# Patient Record
Sex: FEMALE | Race: BLACK OR AFRICAN AMERICAN | NOT HISPANIC OR LATINO | URBAN - METROPOLITAN AREA
[De-identification: names, ages, dates, MRNs, and addresses within clinical notes are randomized per-mention and may not be internally consistent; named-entity substitution may affect disease eponyms.]

---

## 2021-07-02 ENCOUNTER — INPATIENT (INPATIENT)
Facility: HOSPITAL | Age: 66
LOS: 6 days | Discharge: HOME | DRG: 871 | End: 2021-07-09
Attending: INTERNAL MEDICINE | Admitting: INTERNAL MEDICINE
Payer: COMMERCIAL

## 2021-07-02 VITALS
TEMPERATURE: 102 F | HEIGHT: 64 IN | RESPIRATION RATE: 28 BRPM | DIASTOLIC BLOOD PRESSURE: 86 MMHG | HEART RATE: 121 BPM | WEIGHT: 67.68 LBS | OXYGEN SATURATION: 90 % | SYSTOLIC BLOOD PRESSURE: 180 MMHG

## 2021-07-02 DIAGNOSIS — J18.9 PNEUMONIA, UNSPECIFIED ORGANISM: ICD-10-CM

## 2021-07-02 LAB
ALBUMIN SERPL ELPH-MCNC: 3.5 G/DL — SIGNIFICANT CHANGE UP (ref 3.3–5.2)
ALP SERPL-CCNC: 236 U/L — HIGH (ref 40–120)
ALT FLD-CCNC: 76 U/L — HIGH
ANION GAP SERPL CALC-SCNC: 14 MMOL/L — SIGNIFICANT CHANGE UP (ref 5–17)
APPEARANCE UR: CLEAR — SIGNIFICANT CHANGE UP
APTT BLD: 29.3 SEC — SIGNIFICANT CHANGE UP (ref 27.5–35.5)
AST SERPL-CCNC: 75 U/L — HIGH
BACTERIA # UR AUTO: ABNORMAL
BASOPHILS # BLD AUTO: 0.04 K/UL — SIGNIFICANT CHANGE UP (ref 0–0.2)
BASOPHILS NFR BLD AUTO: 0.3 % — SIGNIFICANT CHANGE UP (ref 0–2)
BILIRUB SERPL-MCNC: 1.2 MG/DL — SIGNIFICANT CHANGE UP (ref 0.4–2)
BILIRUB UR-MCNC: NEGATIVE — SIGNIFICANT CHANGE UP
BUN SERPL-MCNC: 6.9 MG/DL — LOW (ref 8–20)
CALCIUM SERPL-MCNC: 9 MG/DL — SIGNIFICANT CHANGE UP (ref 8.6–10.2)
CHLORIDE SERPL-SCNC: 97 MMOL/L — LOW (ref 98–107)
CO2 SERPL-SCNC: 23 MMOL/L — SIGNIFICANT CHANGE UP (ref 22–29)
COLOR SPEC: YELLOW — SIGNIFICANT CHANGE UP
CREAT SERPL-MCNC: 0.75 MG/DL — SIGNIFICANT CHANGE UP (ref 0.5–1.3)
DIFF PNL FLD: NEGATIVE — SIGNIFICANT CHANGE UP
EOSINOPHIL # BLD AUTO: 0.01 K/UL — SIGNIFICANT CHANGE UP (ref 0–0.5)
EOSINOPHIL NFR BLD AUTO: 0.1 % — SIGNIFICANT CHANGE UP (ref 0–6)
EPI CELLS # UR: SIGNIFICANT CHANGE UP
GLUCOSE SERPL-MCNC: 167 MG/DL — HIGH (ref 70–99)
GLUCOSE UR QL: NEGATIVE MG/DL — SIGNIFICANT CHANGE UP
HCT VFR BLD CALC: 39.1 % — SIGNIFICANT CHANGE UP (ref 34.5–45)
HGB BLD-MCNC: 13.4 G/DL — SIGNIFICANT CHANGE UP (ref 11.5–15.5)
IMM GRANULOCYTES NFR BLD AUTO: 0.6 % — SIGNIFICANT CHANGE UP (ref 0–1.5)
INR BLD: 1.34 RATIO — HIGH (ref 0.88–1.16)
KETONES UR-MCNC: NEGATIVE — SIGNIFICANT CHANGE UP
LACTATE BLDV-MCNC: 1.3 MMOL/L — SIGNIFICANT CHANGE UP (ref 0.5–2)
LEUKOCYTE ESTERASE UR-ACNC: NEGATIVE — SIGNIFICANT CHANGE UP
LYMPHOCYTES # BLD AUTO: 1.75 K/UL — SIGNIFICANT CHANGE UP (ref 1–3.3)
LYMPHOCYTES # BLD AUTO: 11.2 % — LOW (ref 13–44)
MCHC RBC-ENTMCNC: 31.6 PG — SIGNIFICANT CHANGE UP (ref 27–34)
MCHC RBC-ENTMCNC: 34.3 GM/DL — SIGNIFICANT CHANGE UP (ref 32–36)
MCV RBC AUTO: 92.2 FL — SIGNIFICANT CHANGE UP (ref 80–100)
MONOCYTES # BLD AUTO: 1.23 K/UL — HIGH (ref 0–0.9)
MONOCYTES NFR BLD AUTO: 7.9 % — SIGNIFICANT CHANGE UP (ref 2–14)
NEUTROPHILS # BLD AUTO: 12.43 K/UL — HIGH (ref 1.8–7.4)
NEUTROPHILS NFR BLD AUTO: 79.9 % — HIGH (ref 43–77)
NITRITE UR-MCNC: NEGATIVE — SIGNIFICANT CHANGE UP
PH UR: 7 — SIGNIFICANT CHANGE UP (ref 5–8)
PLATELET # BLD AUTO: 451 K/UL — HIGH (ref 150–400)
POTASSIUM SERPL-MCNC: 3.1 MMOL/L — LOW (ref 3.5–5.3)
POTASSIUM SERPL-SCNC: 3.1 MMOL/L — LOW (ref 3.5–5.3)
PROT SERPL-MCNC: 7.5 G/DL — SIGNIFICANT CHANGE UP (ref 6.6–8.7)
PROT UR-MCNC: 15 MG/DL
PROTHROM AB SERPL-ACNC: 15.3 SEC — HIGH (ref 10.6–13.6)
RAPID RVP RESULT: SIGNIFICANT CHANGE UP
RBC # BLD: 4.24 M/UL — SIGNIFICANT CHANGE UP (ref 3.8–5.2)
RBC # FLD: 12.7 % — SIGNIFICANT CHANGE UP (ref 10.3–14.5)
RBC CASTS # UR COMP ASSIST: SIGNIFICANT CHANGE UP /HPF (ref 0–4)
SARS-COV-2 RNA SPEC QL NAA+PROBE: SIGNIFICANT CHANGE UP
SODIUM SERPL-SCNC: 134 MMOL/L — LOW (ref 135–145)
SP GR SPEC: 1 — LOW (ref 1.01–1.02)
UROBILINOGEN FLD QL: 1 MG/DL
WBC # BLD: 15.56 K/UL — HIGH (ref 3.8–10.5)
WBC # FLD AUTO: 15.56 K/UL — HIGH (ref 3.8–10.5)
WBC UR QL: SIGNIFICANT CHANGE UP

## 2021-07-02 PROCEDURE — 71045 X-RAY EXAM CHEST 1 VIEW: CPT | Mod: 26

## 2021-07-02 PROCEDURE — 99291 CRITICAL CARE FIRST HOUR: CPT | Mod: 25

## 2021-07-02 PROCEDURE — 93010 ELECTROCARDIOGRAM REPORT: CPT

## 2021-07-02 PROCEDURE — 99223 1ST HOSP IP/OBS HIGH 75: CPT

## 2021-07-02 PROCEDURE — 32551 INSERTION OF CHEST TUBE: CPT

## 2021-07-02 RX ORDER — SODIUM CHLORIDE 9 MG/ML
950 INJECTION INTRAMUSCULAR; INTRAVENOUS; SUBCUTANEOUS ONCE
Refills: 0 | Status: DISCONTINUED | OUTPATIENT
Start: 2021-07-02 | End: 2021-07-02

## 2021-07-02 RX ORDER — ACETAMINOPHEN 500 MG
650 TABLET ORAL EVERY 6 HOURS
Refills: 0 | Status: DISCONTINUED | OUTPATIENT
Start: 2021-07-02 | End: 2021-07-09

## 2021-07-02 RX ORDER — CEFTRIAXONE 500 MG/1
1000 INJECTION, POWDER, FOR SOLUTION INTRAMUSCULAR; INTRAVENOUS EVERY 24 HOURS
Refills: 0 | Status: COMPLETED | OUTPATIENT
Start: 2021-07-02 | End: 2021-07-06

## 2021-07-02 RX ORDER — DEXTROSE MONOHYDRATE, SODIUM CHLORIDE, AND POTASSIUM CHLORIDE 50; .745; 4.5 G/1000ML; G/1000ML; G/1000ML
1000 INJECTION, SOLUTION INTRAVENOUS
Refills: 0 | Status: COMPLETED | OUTPATIENT
Start: 2021-07-02 | End: 2021-07-02

## 2021-07-02 RX ORDER — AZITHROMYCIN 500 MG/1
500 TABLET, FILM COATED ORAL ONCE
Refills: 0 | Status: COMPLETED | OUTPATIENT
Start: 2021-07-02 | End: 2021-07-02

## 2021-07-02 RX ORDER — POTASSIUM CHLORIDE 20 MEQ
40 PACKET (EA) ORAL ONCE
Refills: 0 | Status: COMPLETED | OUTPATIENT
Start: 2021-07-02 | End: 2021-07-02

## 2021-07-02 RX ORDER — CEFTRIAXONE 500 MG/1
1000 INJECTION, POWDER, FOR SOLUTION INTRAMUSCULAR; INTRAVENOUS ONCE
Refills: 0 | Status: COMPLETED | OUTPATIENT
Start: 2021-07-02 | End: 2021-07-02

## 2021-07-02 RX ORDER — ENOXAPARIN SODIUM 100 MG/ML
40 INJECTION SUBCUTANEOUS DAILY
Refills: 0 | Status: DISCONTINUED | OUTPATIENT
Start: 2021-07-02 | End: 2021-07-09

## 2021-07-02 RX ORDER — ACETAMINOPHEN 500 MG
650 TABLET ORAL ONCE
Refills: 0 | Status: COMPLETED | OUTPATIENT
Start: 2021-07-02 | End: 2021-07-02

## 2021-07-02 RX ORDER — POTASSIUM CHLORIDE 20 MEQ
10 PACKET (EA) ORAL ONCE
Refills: 0 | Status: COMPLETED | OUTPATIENT
Start: 2021-07-02 | End: 2021-07-02

## 2021-07-02 RX ORDER — TRAMADOL HYDROCHLORIDE 50 MG/1
50 TABLET ORAL THREE TIMES A DAY
Refills: 0 | Status: DISCONTINUED | OUTPATIENT
Start: 2021-07-02 | End: 2021-07-02

## 2021-07-02 RX ORDER — ONDANSETRON 8 MG/1
4 TABLET, FILM COATED ORAL
Refills: 0 | Status: DISCONTINUED | OUTPATIENT
Start: 2021-07-02 | End: 2021-07-09

## 2021-07-02 RX ORDER — AZITHROMYCIN 500 MG/1
500 TABLET, FILM COATED ORAL EVERY 24 HOURS
Refills: 0 | Status: COMPLETED | OUTPATIENT
Start: 2021-07-02 | End: 2021-07-06

## 2021-07-02 RX ORDER — SODIUM CHLORIDE 9 MG/ML
2000 INJECTION INTRAMUSCULAR; INTRAVENOUS; SUBCUTANEOUS ONCE
Refills: 0 | Status: COMPLETED | OUTPATIENT
Start: 2021-07-02 | End: 2021-07-02

## 2021-07-02 RX ORDER — SODIUM CHLORIDE 9 MG/ML
28 INJECTION INTRAMUSCULAR; INTRAVENOUS; SUBCUTANEOUS ONCE
Refills: 0 | Status: COMPLETED | OUTPATIENT
Start: 2021-07-02 | End: 2021-07-02

## 2021-07-02 RX ADMIN — ENOXAPARIN SODIUM 40 MILLIGRAM(S): 100 INJECTION SUBCUTANEOUS at 11:21

## 2021-07-02 RX ADMIN — AZITHROMYCIN 255 MILLIGRAM(S): 500 TABLET, FILM COATED ORAL at 08:23

## 2021-07-02 RX ADMIN — Medication 650 MILLIGRAM(S): at 23:32

## 2021-07-02 RX ADMIN — Medication 40 MILLIEQUIVALENT(S): at 08:58

## 2021-07-02 RX ADMIN — Medication 100 MILLIEQUIVALENT(S): at 08:58

## 2021-07-02 RX ADMIN — Medication 650 MILLIGRAM(S): at 07:52

## 2021-07-02 RX ADMIN — SODIUM CHLORIDE 28 MILLILITER(S): 9 INJECTION INTRAMUSCULAR; INTRAVENOUS; SUBCUTANEOUS at 08:58

## 2021-07-02 RX ADMIN — CEFTRIAXONE 100 MILLIGRAM(S): 500 INJECTION, POWDER, FOR SOLUTION INTRAMUSCULAR; INTRAVENOUS at 07:45

## 2021-07-02 RX ADMIN — DEXTROSE MONOHYDRATE, SODIUM CHLORIDE, AND POTASSIUM CHLORIDE 83 MILLILITER(S): 50; .745; 4.5 INJECTION, SOLUTION INTRAVENOUS at 14:35

## 2021-07-02 RX ADMIN — DEXTROSE MONOHYDRATE, SODIUM CHLORIDE, AND POTASSIUM CHLORIDE 83 MILLILITER(S): 50; .745; 4.5 INJECTION, SOLUTION INTRAVENOUS at 15:44

## 2021-07-02 RX ADMIN — SODIUM CHLORIDE 2000 MILLILITER(S): 9 INJECTION INTRAMUSCULAR; INTRAVENOUS; SUBCUTANEOUS at 07:44

## 2021-07-02 NOTE — ED PROVIDER NOTE - OBJECTIVE STATEMENT
67yo F denies PMH presents for CP and fever x3d. Reports substernal pleuritic nonexertional CP radiating to the back, assoc/w SOB and low-grade fever. Denies cough, n/v, diarrhea, constipation, dysuria, hematuria. Denies sick contacts. Has not received COVID vaccine. Last took tylenol yesterday. Denies pmh or meds, but last saw a doctor years ago. NKDA. Nonsmoker.

## 2021-07-02 NOTE — ED PROVIDER NOTE - CLINICAL SUMMARY MEDICAL DECISION MAKING FREE TEXT BOX
65yo F denies PMH presents for CP, SOB, and fever x3d. Tachycardic, febrile, and hypoxic to 89% on RA in triage, activated as code sepsis. Placed on 3L NC. Fluids, antibiotics initiated. EKG unremarkable. Labs, CXR pending.

## 2021-07-02 NOTE — H&P ADULT - HISTORY OF PRESENT ILLNESS
67 yo F w/ no PMHx, no MD f/u presents to ER for 2-3 days of fevers, chills and right sided chest pain, worse on inspiration. minimal cough.  took tylenol at home w/o improvement. denies GI complaints.  in ER, febrile, tachy with tachypnea, leukocytosis and RLL infiltrate on CXR.

## 2021-07-02 NOTE — ED PROVIDER NOTE - NS ED ROS FT
Constitutional: +fever, no sweats, and no chills.  CV: +chest pain, no edema.  Resp: no cough, +dyspnea  GI: no abdominal pain, no nausea and no vomiting.  MSK: no msk pain, no weakness  Skin: no lesions, and no rashes.  Neuro: no headache, no sensory deficits, and no dizziness  ROS otherwise negative except as noted in HPI.

## 2021-07-02 NOTE — ED PROVIDER NOTE - ATTENDING CONTRIBUTION TO CARE
I, Vasyl Murphy, personally saw the patient with the resident, and completed the key components of the history and physical exam. I then discussed the management plan with the resident.    Upon my evaluation, this patient had a high probability of imminent or life-threatening deterioration due to sepsis_ , which required my direct attention, intervention, and personal management.    65 yo F no pmh, has not followed up with pmd in "many years", p/w chest pain and fever x 3 days. patient report pain worse with breathing. Patient was found tachycardic HR 120s, febrile 101. hypoxic 89% on RA, improved with 4 L O2. wbc 15. lacatate 2.8.  30 cc/kg ivf, tylenol, rocephine and azithromicine given. patient also found to have mild transmitis but no abdominal pain. K 3.1, oral and iv potassium given. cxr showed right sided infiltrate. patient admitted for sepsis secondary to pneumonia.     I have personally provided _40_ minutes of critical care time exclusive of time spent on separately billable procedures.  Time includes review of laboratory data, radiology results, discussion with consultants, and monitoring for potential decompensation.  Interventions were performed as documented.

## 2021-07-02 NOTE — ED PROVIDER NOTE - PHYSICAL EXAMINATION
General: well appearing, NAD  Head: NC/AT  Cardiac: tachycardic, regular rhythm, no m/r/g  Respiratory: CTABL, equal chest wall expansions, satting 89% on RA, 91% on 3L NC  Abdomen: soft, ND, NT  Neuro: AAOx3,coordinated movement of all 4 extremities  Psych: calm, cooperative, normal affect  Skin: warm and dry

## 2021-07-02 NOTE — ED ADULT NURSE NOTE - OBJECTIVE STATEMENT
Pt reports  feeling mild inspiratory type chest discomfort and dry cough for a few days and low grade fevers at home. Noted to be hypoxic at time of RN assessment requiring 3l nc to maintain sp02 <90%. Pt denies having received COVID vaccine and denies sick contacts.

## 2021-07-02 NOTE — H&P ADULT - ASSESSMENT
65 yo F w/ no PMHx, no MD f/u presents to ER for 2-3 days of fevers, chills and right sided chest pain, worse on inspiration. minimal cough.  took tylenol at home w/o improvement. denies GI complaints.  in ER, febrile, tachy with tachypnea, leukocytosis and RLL infiltrate on CXR.      community acquired pneumonia: sepsis criteria present on admission     IV rocephin/azithromycin. f/u cultures     antipyretics, antitussives, pain control      if no improvement, CT chest     hypokalemia: repleted    transaminitis: mild, trend.    elevated lactate: improved post fluids    ppx: lovenox.

## 2021-07-03 LAB
ALBUMIN SERPL ELPH-MCNC: 2.4 G/DL — LOW (ref 3.3–5.2)
ALP SERPL-CCNC: 194 U/L — HIGH (ref 40–120)
ALT FLD-CCNC: 58 U/L — HIGH
ANION GAP SERPL CALC-SCNC: 12 MMOL/L — SIGNIFICANT CHANGE UP (ref 5–17)
AST SERPL-CCNC: 43 U/L — HIGH
BASOPHILS # BLD AUTO: 0.07 K/UL — SIGNIFICANT CHANGE UP (ref 0–0.2)
BASOPHILS NFR BLD AUTO: 0.4 % — SIGNIFICANT CHANGE UP (ref 0–2)
BILIRUB SERPL-MCNC: 0.8 MG/DL — SIGNIFICANT CHANGE UP (ref 0.4–2)
BUN SERPL-MCNC: 4.8 MG/DL — LOW (ref 8–20)
CALCIUM SERPL-MCNC: 8.9 MG/DL — SIGNIFICANT CHANGE UP (ref 8.6–10.2)
CHLORIDE SERPL-SCNC: 100 MMOL/L — SIGNIFICANT CHANGE UP (ref 98–107)
CO2 SERPL-SCNC: 19 MMOL/L — LOW (ref 22–29)
CREAT SERPL-MCNC: 0.44 MG/DL — LOW (ref 0.5–1.3)
CULTURE RESULTS: SIGNIFICANT CHANGE UP
EOSINOPHIL # BLD AUTO: 0.03 K/UL — SIGNIFICANT CHANGE UP (ref 0–0.5)
EOSINOPHIL NFR BLD AUTO: 0.2 % — SIGNIFICANT CHANGE UP (ref 0–6)
GLUCOSE SERPL-MCNC: 150 MG/DL — HIGH (ref 70–99)
HCT VFR BLD CALC: 39.1 % — SIGNIFICANT CHANGE UP (ref 34.5–45)
HGB BLD-MCNC: 12.3 G/DL — SIGNIFICANT CHANGE UP (ref 11.5–15.5)
IMM GRANULOCYTES NFR BLD AUTO: 1.5 % — SIGNIFICANT CHANGE UP (ref 0–1.5)
LYMPHOCYTES # BLD AUTO: 1.46 K/UL — SIGNIFICANT CHANGE UP (ref 1–3.3)
LYMPHOCYTES # BLD AUTO: 8.8 % — LOW (ref 13–44)
MAGNESIUM SERPL-MCNC: 1.9 MG/DL — SIGNIFICANT CHANGE UP (ref 1.6–2.6)
MCHC RBC-ENTMCNC: 31.2 PG — SIGNIFICANT CHANGE UP (ref 27–34)
MCHC RBC-ENTMCNC: 31.5 GM/DL — LOW (ref 32–36)
MCV RBC AUTO: 99.2 FL — SIGNIFICANT CHANGE UP (ref 80–100)
MONOCYTES # BLD AUTO: 1.05 K/UL — HIGH (ref 0–0.9)
MONOCYTES NFR BLD AUTO: 6.4 % — SIGNIFICANT CHANGE UP (ref 2–14)
NEUTROPHILS # BLD AUTO: 13.68 K/UL — HIGH (ref 1.8–7.4)
NEUTROPHILS NFR BLD AUTO: 82.7 % — HIGH (ref 43–77)
PHOSPHATE SERPL-MCNC: 1.5 MG/DL — LOW (ref 2.4–4.7)
PLATELET # BLD AUTO: 301 K/UL — SIGNIFICANT CHANGE UP (ref 150–400)
POTASSIUM SERPL-MCNC: 4 MMOL/L — SIGNIFICANT CHANGE UP (ref 3.5–5.3)
POTASSIUM SERPL-SCNC: 4 MMOL/L — SIGNIFICANT CHANGE UP (ref 3.5–5.3)
PROT SERPL-MCNC: 6.5 G/DL — LOW (ref 6.6–8.7)
RBC # BLD: 3.94 M/UL — SIGNIFICANT CHANGE UP (ref 3.8–5.2)
RBC # FLD: 12.6 % — SIGNIFICANT CHANGE UP (ref 10.3–14.5)
SODIUM SERPL-SCNC: 131 MMOL/L — LOW (ref 135–145)
SPECIMEN SOURCE: SIGNIFICANT CHANGE UP
WBC # BLD: 16.53 K/UL — HIGH (ref 3.8–10.5)
WBC # FLD AUTO: 16.53 K/UL — HIGH (ref 3.8–10.5)

## 2021-07-03 PROCEDURE — 99233 SBSQ HOSP IP/OBS HIGH 50: CPT

## 2021-07-03 PROCEDURE — 71260 CT THORAX DX C+: CPT | Mod: 26

## 2021-07-03 RX ORDER — SODIUM,POTASSIUM PHOSPHATES 278-250MG
1 POWDER IN PACKET (EA) ORAL ONCE
Refills: 0 | Status: COMPLETED | OUTPATIENT
Start: 2021-07-03 | End: 2021-07-03

## 2021-07-03 RX ORDER — SODIUM CHLORIDE 9 MG/ML
1000 INJECTION INTRAMUSCULAR; INTRAVENOUS; SUBCUTANEOUS
Refills: 0 | Status: DISCONTINUED | OUTPATIENT
Start: 2021-07-03 | End: 2021-07-04

## 2021-07-03 RX ORDER — FUROSEMIDE 40 MG
20 TABLET ORAL ONCE
Refills: 0 | Status: DISCONTINUED | OUTPATIENT
Start: 2021-07-03 | End: 2021-07-03

## 2021-07-03 RX ADMIN — Medication 1 PACKET(S): at 17:16

## 2021-07-03 RX ADMIN — SODIUM CHLORIDE 75 MILLILITER(S): 9 INJECTION INTRAMUSCULAR; INTRAVENOUS; SUBCUTANEOUS at 17:16

## 2021-07-03 RX ADMIN — Medication 650 MILLIGRAM(S): at 00:30

## 2021-07-03 RX ADMIN — AZITHROMYCIN 255 MILLIGRAM(S): 500 TABLET, FILM COATED ORAL at 08:45

## 2021-07-03 RX ADMIN — ENOXAPARIN SODIUM 40 MILLIGRAM(S): 100 INJECTION SUBCUTANEOUS at 10:18

## 2021-07-03 RX ADMIN — CEFTRIAXONE 100 MILLIGRAM(S): 500 INJECTION, POWDER, FOR SOLUTION INTRAMUSCULAR; INTRAVENOUS at 10:18

## 2021-07-03 NOTE — PROGRESS NOTE ADULT - SUBJECTIVE AND OBJECTIVE BOX
CC: Follow up    INTERVAL HPI/OVERNIGHT EVENTS: Patient seen and examined, on 2 liters via nasal cannula. COmplaints of right sided chest pain with deep inspiration.       Vital Signs Last 24 Hrs  T(C): 36.3 (2021 05:24), Max: 38.7 (2021 23:27)  T(F): 97.4 (2021 05:24), Max: 101.6 (2021 23:27)  HR: 92 (2021 05:24) (85 - 111)  BP: 157/89 (2021 05:24) (156/88 - 167/93)  BP(mean): --  RR: 18 (2021 05:24) (18 - 20)  SpO2: 95% (2021 05:24) (95% - 98%)    PHYSICAL EXAM:    GENERAL: NAD, AOX3  HEAD:  Atraumatic, Normocephalic  EYES: EOMI, PERRLA, conjunctiva and sclera clear  ENMT: Moist mucous membranes  NECK: Supple, No JVD  CHEST/LUNG: RLL rhonchi  HEART: Regular rate and rhythm; No murmurs, rubs, or gallops  ABDOMEN: Soft, Nontender, Nondistended; Bowel sounds present  EXTREMITIES:  2+ Peripheral Pulses, No clubbing, cyanosis, or edema        MEDICATIONS  (STANDING):  azithromycin  IVPB 500 milliGRAM(s) IV Intermittent every 24 hours  cefTRIAXone   IVPB 1000 milliGRAM(s) IV Intermittent every 24 hours  enoxaparin Injectable 40 milliGRAM(s) SubCutaneous daily    MEDICATIONS  (PRN):  acetaminophen   Tablet .. 650 milliGRAM(s) Oral every 6 hours PRN Temp greater or equal to 38C (100.4F), Mild Pain (1 - 3), Moderate Pain (4 - 6)  guaiFENesin Oral Liquid (Sugar-Free) 200 milliGRAM(s) Oral every 6 hours PRN Cough  ondansetron Injectable 4 milliGRAM(s) IV Push four times a day PRN Nausea and/or Vomiting  traMADol 50 milliGRAM(s) Oral three times a day PRN Severe Pain (7 - 10)      Allergies    No Known Allergies    Intolerances          LABS:                          12.3   16.53 )-----------( 301      ( 2021 10:26 )             39.1     07-03    131<L>  |  100  |  4.8<L>  ----------------------------<  150<H>  4.0   |  19.0<L>  |  0.44<L>    Ca    8.9      2021 10:26  Phos  1.5       Mg     1.9         TPro  6.5<L>  /  Alb  2.4<L>  /  TBili  0.8  /  DBili  x   /  AST  43<H>  /  ALT  58<H>  /  AlkPhos  194<H>      PT/INR - ( 2021 07:48 )   PT: 15.3 sec;   INR: 1.34 ratio         PTT - ( 2021 07:48 )  PTT:29.3 sec  Urinalysis Basic - ( 2021 20:50 )    Color: Yellow / Appearance: Clear / S.005 / pH: x  Gluc: x / Ketone: Negative  / Bili: Negative / Urobili: 1 mg/dL   Blood: x / Protein: 15 mg/dL / Nitrite: Negative   Leuk Esterase: Negative / RBC: 0-2 /HPF / WBC 0-2   Sq Epi: x / Non Sq Epi: Occasional / Bacteria: Few        RADIOLOGY & ADDITIONAL TESTS:

## 2021-07-03 NOTE — PROGRESS NOTE ADULT - ASSESSMENT
The patient is a 66 year old female with no significant past medical history who presented to the Er with complaints of fever, chills and right sided chest pain. Admitted for sepsis secondary to right lower lobe pneumonia.     Assessment/Plan:    1. Sepsis secondary to right lower lobe community acquired pneumonia: IV Rocephin and azithromycin day 2  COVID negative  RVP negative    2, Hyponatremia with metabolic acidosis    3. Transaminitis    4. Hypophosphatemia    VTE Lovenox subcut      The patient is a 66 year old female with no significant past medical history who presented to the Er with complaints of fever, chills and right sided chest pain. Admitted for sepsis secondary to right lower lobe pneumonia.     Assessment/Plan:    1. Sepsis secondary to right lower lobe community acquired pneumonia: IV Rocephin and azithromycin day 2  COVID negative  RVP negative  CT Chest with IV contrast  Follow up blood cultures    2, Hyponatremia with metabolic acidosis IV hydration  Monitor bMP    3. Transaminitis Improving  Trend LFTS    4. Hypophosphatemia Supplement Phos     VTE Lovenox subcut

## 2021-07-04 LAB
ALBUMIN SERPL ELPH-MCNC: 2.7 G/DL — LOW (ref 3.3–5.2)
ALP SERPL-CCNC: 203 U/L — HIGH (ref 40–120)
ALT FLD-CCNC: 62 U/L — HIGH
ANION GAP SERPL CALC-SCNC: 12 MMOL/L — SIGNIFICANT CHANGE UP (ref 5–17)
AST SERPL-CCNC: 58 U/L — HIGH
BILIRUB SERPL-MCNC: 0.9 MG/DL — SIGNIFICANT CHANGE UP (ref 0.4–2)
BUN SERPL-MCNC: 4.2 MG/DL — LOW (ref 8–20)
CALCIUM SERPL-MCNC: 8.7 MG/DL — SIGNIFICANT CHANGE UP (ref 8.6–10.2)
CHLORIDE SERPL-SCNC: 97 MMOL/L — LOW (ref 98–107)
CO2 SERPL-SCNC: 23 MMOL/L — SIGNIFICANT CHANGE UP (ref 22–29)
COVID-19 SPIKE DOMAIN AB INTERP: NEGATIVE — SIGNIFICANT CHANGE UP
COVID-19 SPIKE DOMAIN ANTIBODY RESULT: 0.4 U/ML — SIGNIFICANT CHANGE UP
CREAT SERPL-MCNC: 0.47 MG/DL — LOW (ref 0.5–1.3)
GLUCOSE SERPL-MCNC: 121 MG/DL — HIGH (ref 70–99)
HCT VFR BLD CALC: 36.9 % — SIGNIFICANT CHANGE UP (ref 34.5–45)
HCV AB S/CO SERPL IA: 0.12 S/CO — SIGNIFICANT CHANGE UP (ref 0–0.99)
HCV AB SERPL-IMP: SIGNIFICANT CHANGE UP
HGB BLD-MCNC: 11.9 G/DL — SIGNIFICANT CHANGE UP (ref 11.5–15.5)
MCHC RBC-ENTMCNC: 30.7 PG — SIGNIFICANT CHANGE UP (ref 27–34)
MCHC RBC-ENTMCNC: 32.2 GM/DL — SIGNIFICANT CHANGE UP (ref 32–36)
MCV RBC AUTO: 95.1 FL — SIGNIFICANT CHANGE UP (ref 80–100)
PLATELET # BLD AUTO: 463 K/UL — HIGH (ref 150–400)
POTASSIUM SERPL-MCNC: 3.5 MMOL/L — SIGNIFICANT CHANGE UP (ref 3.5–5.3)
POTASSIUM SERPL-SCNC: 3.5 MMOL/L — SIGNIFICANT CHANGE UP (ref 3.5–5.3)
PROT SERPL-MCNC: 6.7 G/DL — SIGNIFICANT CHANGE UP (ref 6.6–8.7)
RBC # BLD: 3.88 M/UL — SIGNIFICANT CHANGE UP (ref 3.8–5.2)
RBC # FLD: 12.6 % — SIGNIFICANT CHANGE UP (ref 10.3–14.5)
SARS-COV-2 IGG+IGM SERPL QL IA: 0.4 U/ML — SIGNIFICANT CHANGE UP
SARS-COV-2 IGG+IGM SERPL QL IA: NEGATIVE — SIGNIFICANT CHANGE UP
SODIUM SERPL-SCNC: 132 MMOL/L — LOW (ref 135–145)
WBC # BLD: 16.99 K/UL — HIGH (ref 3.8–10.5)
WBC # FLD AUTO: 16.99 K/UL — HIGH (ref 3.8–10.5)

## 2021-07-04 PROCEDURE — 99233 SBSQ HOSP IP/OBS HIGH 50: CPT

## 2021-07-04 RX ADMIN — CEFTRIAXONE 100 MILLIGRAM(S): 500 INJECTION, POWDER, FOR SOLUTION INTRAMUSCULAR; INTRAVENOUS at 10:07

## 2021-07-04 RX ADMIN — SODIUM CHLORIDE 75 MILLILITER(S): 9 INJECTION INTRAMUSCULAR; INTRAVENOUS; SUBCUTANEOUS at 02:24

## 2021-07-04 RX ADMIN — ENOXAPARIN SODIUM 40 MILLIGRAM(S): 100 INJECTION SUBCUTANEOUS at 11:46

## 2021-07-04 RX ADMIN — AZITHROMYCIN 255 MILLIGRAM(S): 500 TABLET, FILM COATED ORAL at 08:15

## 2021-07-04 NOTE — PROGRESS NOTE ADULT - SUBJECTIVE AND OBJECTIVE BOX
CC: Follow up    INTERVAL HPI/OVERNIGHT EVENTS: Patient seen and examined, still having difficulty coughing and taking a deep breath due to right sided chest pain. TMAx of 99 overnight. On 2 liters via nasal cannula.       Vital Signs Last 24 Hrs  T(C): 37.3 (2021 04:18), Max: 37.4 (2021 16:52)  T(F): 99.1 (2021 04:18), Max: 99.4 (2021 16:52)  HR: 103 (2021 04:18) (87 - 104)  BP: 162/78 (2021 04:46) (148/82 - 165/88)  BP(mean): --  RR: 18 (2021 04:18) (18 - 18)  SpO2: 93% (2021 04:18) (91% - 94%)    PHYSICAL EXAM:    GENERAL: NAD, AOX3  HEAD:  Atraumatic, Normocephalic  EYES: conjunctiva and sclera clear  ENMT: Moist mucous membranes  NECK: Supple, No JVD  CHEST/LUNG: Decreased breath sounds bilaterally   HEART: Regular rate and rhythm; No murmurs, rubs, or gallops  ABDOMEN: Soft, Nontender, Nondistended; Bowel sounds present  EXTREMITIES:  2+ Peripheral Pulses, No clubbing, cyanosis, or edema        MEDICATIONS  (STANDING):  azithromycin  IVPB 500 milliGRAM(s) IV Intermittent every 24 hours  cefTRIAXone   IVPB 1000 milliGRAM(s) IV Intermittent every 24 hours  enoxaparin Injectable 40 milliGRAM(s) SubCutaneous daily    MEDICATIONS  (PRN):  acetaminophen   Tablet .. 650 milliGRAM(s) Oral every 6 hours PRN Temp greater or equal to 38C (100.4F), Mild Pain (1 - 3), Moderate Pain (4 - 6)  guaiFENesin Oral Liquid (Sugar-Free) 200 milliGRAM(s) Oral every 6 hours PRN Cough  ondansetron Injectable 4 milliGRAM(s) IV Push four times a day PRN Nausea and/or Vomiting  traMADol 50 milliGRAM(s) Oral three times a day PRN Severe Pain (7 - 10)      Allergies    No Known Allergies    Intolerances          LABS:                          11.9   16.99 )-----------( 463      ( 2021 08:56 )             36.9     07-04    132<L>  |  97<L>  |  4.2<L>  ----------------------------<  121<H>  3.5   |  23.0  |  0.47<L>    Ca    8.7      2021 08:56  Phos  1.5     07-03  Mg     1.9     07-03    TPro  6.7  /  Alb  2.7<L>  /  TBili  0.9  /  DBili  x   /  AST  58<H>  /  ALT  62<H>  /  AlkPhos  203<H>  07-04      Urinalysis Basic - ( 2021 20:50 )    Color: Yellow / Appearance: Clear / S.005 / pH: x  Gluc: x / Ketone: Negative  / Bili: Negative / Urobili: 1 mg/dL   Blood: x / Protein: 15 mg/dL / Nitrite: Negative   Leuk Esterase: Negative / RBC: 0-2 /HPF / WBC 0-2   Sq Epi: x / Non Sq Epi: Occasional / Bacteria: Few        RADIOLOGY & ADDITIONAL TESTS:

## 2021-07-04 NOTE — PROGRESS NOTE ADULT - ASSESSMENT
The patient is a 66 year old female with no significant past medical history who presented to the Er with complaints of fever, chills and right sided chest pain. Admitted for sepsis secondary to right lower lobe pneumonia.     Assessment/Plan:    1. Sepsis secondary to right lower lobe community acquired pneumonia: IV Rocephin and azithromycin day 3  COVID negative  RVP negative  CT Chest with IV contrast with small foci of bilateral pneumonia moderate bilateral pleural effusion suspect parapneumonic   Blood cultures x 2 negative  Urine culture negative   Incentive spirometry     2, Hyponatremia with metabolic acidosis  Improved with IV hydration   Monitor bMP    3. Transaminitis Elevated LFTS with leukocytosis despite antibiotics for pneumonia   RUQ ultrasound to rule out cholecystitis   Trend LFTS    4. Hypophosphatemia Supplement Phos     5. Elevated BP: Has not seen a physician in >30 years  Check echocardiogram given bilateral effusions       VTE Lovenox subcut

## 2021-07-05 LAB
A1C WITH ESTIMATED AVERAGE GLUCOSE RESULT: 4.9 % — SIGNIFICANT CHANGE UP (ref 4–5.6)
ALBUMIN SERPL ELPH-MCNC: 2.5 G/DL — LOW (ref 3.3–5.2)
ALP SERPL-CCNC: 223 U/L — HIGH (ref 40–120)
ALT FLD-CCNC: 86 U/L — HIGH
ANION GAP SERPL CALC-SCNC: 12 MMOL/L — SIGNIFICANT CHANGE UP (ref 5–17)
AST SERPL-CCNC: 87 U/L — HIGH
BILIRUB SERPL-MCNC: 0.8 MG/DL — SIGNIFICANT CHANGE UP (ref 0.4–2)
BUN SERPL-MCNC: 4.6 MG/DL — LOW (ref 8–20)
CALCIUM SERPL-MCNC: 8.9 MG/DL — SIGNIFICANT CHANGE UP (ref 8.6–10.2)
CHLORIDE SERPL-SCNC: 98 MMOL/L — SIGNIFICANT CHANGE UP (ref 98–107)
CHOLEST SERPL-MCNC: 193 MG/DL — SIGNIFICANT CHANGE UP
CO2 SERPL-SCNC: 23 MMOL/L — SIGNIFICANT CHANGE UP (ref 22–29)
CREAT SERPL-MCNC: 0.51 MG/DL — SIGNIFICANT CHANGE UP (ref 0.5–1.3)
ESTIMATED AVERAGE GLUCOSE: 94 MG/DL — SIGNIFICANT CHANGE UP (ref 68–114)
GLUCOSE SERPL-MCNC: 106 MG/DL — HIGH (ref 70–99)
HCT VFR BLD CALC: 38.5 % — SIGNIFICANT CHANGE UP (ref 34.5–45)
HDLC SERPL-MCNC: 32 MG/DL — LOW
HGB BLD-MCNC: 12.5 G/DL — SIGNIFICANT CHANGE UP (ref 11.5–15.5)
LIPID PNL WITH DIRECT LDL SERPL: 136 MG/DL — HIGH
MCHC RBC-ENTMCNC: 31 PG — SIGNIFICANT CHANGE UP (ref 27–34)
MCHC RBC-ENTMCNC: 32.5 GM/DL — SIGNIFICANT CHANGE UP (ref 32–36)
MCV RBC AUTO: 95.5 FL — SIGNIFICANT CHANGE UP (ref 80–100)
NON HDL CHOLESTEROL: 161 MG/DL — HIGH
PLATELET # BLD AUTO: 510 K/UL — HIGH (ref 150–400)
POTASSIUM SERPL-MCNC: 3.6 MMOL/L — SIGNIFICANT CHANGE UP (ref 3.5–5.3)
POTASSIUM SERPL-SCNC: 3.6 MMOL/L — SIGNIFICANT CHANGE UP (ref 3.5–5.3)
PROT SERPL-MCNC: 6.9 G/DL — SIGNIFICANT CHANGE UP (ref 6.6–8.7)
RBC # BLD: 4.03 M/UL — SIGNIFICANT CHANGE UP (ref 3.8–5.2)
RBC # FLD: 12.6 % — SIGNIFICANT CHANGE UP (ref 10.3–14.5)
SODIUM SERPL-SCNC: 133 MMOL/L — LOW (ref 135–145)
TRIGL SERPL-MCNC: 123 MG/DL — SIGNIFICANT CHANGE UP
WBC # BLD: 12.34 K/UL — HIGH (ref 3.8–10.5)
WBC # FLD AUTO: 12.34 K/UL — HIGH (ref 3.8–10.5)

## 2021-07-05 PROCEDURE — 76700 US EXAM ABDOM COMPLETE: CPT | Mod: 26

## 2021-07-05 PROCEDURE — 99233 SBSQ HOSP IP/OBS HIGH 50: CPT

## 2021-07-05 RX ORDER — AMLODIPINE BESYLATE 2.5 MG/1
5 TABLET ORAL DAILY
Refills: 0 | Status: DISCONTINUED | OUTPATIENT
Start: 2021-07-05 | End: 2021-07-09

## 2021-07-05 RX ADMIN — AMLODIPINE BESYLATE 5 MILLIGRAM(S): 2.5 TABLET ORAL at 12:52

## 2021-07-05 RX ADMIN — Medication 100 MILLIGRAM(S): at 21:46

## 2021-07-05 RX ADMIN — CEFTRIAXONE 100 MILLIGRAM(S): 500 INJECTION, POWDER, FOR SOLUTION INTRAMUSCULAR; INTRAVENOUS at 15:10

## 2021-07-05 RX ADMIN — ENOXAPARIN SODIUM 40 MILLIGRAM(S): 100 INJECTION SUBCUTANEOUS at 12:52

## 2021-07-05 RX ADMIN — Medication 100 MILLIGRAM(S): at 17:38

## 2021-07-05 RX ADMIN — AZITHROMYCIN 255 MILLIGRAM(S): 500 TABLET, FILM COATED ORAL at 12:51

## 2021-07-05 NOTE — PROGRESS NOTE ADULT - SUBJECTIVE AND OBJECTIVE BOX
CC: Follow up\    INTERVAL HPI/OVERNIGHT EVENTS:  Patient seen and examined, no acute complaints overnight Feels better. Afebrile. on 2 liters via nasal canula right lower rib pain with deep inspiration or coughing.       Vital Signs Last 24 Hrs  T(C): 36.8 (05 Jul 2021 10:50), Max: 37.5 (04 Jul 2021 17:10)  T(F): 98.2 (05 Jul 2021 10:50), Max: 99.5 (04 Jul 2021 17:10)  HR: 82 (05 Jul 2021 10:50) (78 - 110)  BP: 132/86 (05 Jul 2021 10:50) (132/86 - 169/89)  BP(mean): --  RR: 18 (05 Jul 2021 10:50) (18 - 18)  SpO2: 96% (05 Jul 2021 10:50) (93% - 96%)    PHYSICAL EXAM:    GENERAL: NAD, AOX3  HEAD:  Atraumatic, Normocephalic  EYES: conjunctiva and sclera clear  ENMT: Moist mucous membranes  NECK: Supple, No JVD  CHEST/LUNG: Right basilar rhonchi  HEART: Regular rate and rhythm; No murmurs, rubs, or gallops  ABDOMEN: Soft, Nontender, Nondistended; Bowel sounds present  EXTREMITIES:  2+ Peripheral Pulses, No clubbing, cyanosis, or edema        MEDICATIONS  (STANDING):  amLODIPine   Tablet 5 milliGRAM(s) Oral daily  azithromycin  IVPB 500 milliGRAM(s) IV Intermittent every 24 hours  cefTRIAXone   IVPB 1000 milliGRAM(s) IV Intermittent every 24 hours  enoxaparin Injectable 40 milliGRAM(s) SubCutaneous daily    MEDICATIONS  (PRN):  acetaminophen   Tablet .. 650 milliGRAM(s) Oral every 6 hours PRN Temp greater or equal to 38C (100.4F), Mild Pain (1 - 3), Moderate Pain (4 - 6)  guaiFENesin Oral Liquid (Sugar-Free) 200 milliGRAM(s) Oral every 6 hours PRN Cough  ondansetron Injectable 4 milliGRAM(s) IV Push four times a day PRN Nausea and/or Vomiting  traMADol 50 milliGRAM(s) Oral three times a day PRN Severe Pain (7 - 10)      Allergies    No Known Allergies    Intolerances          LABS:                          12.5   12.34 )-----------( 510      ( 05 Jul 2021 07:14 )             38.5     07-05    133<L>  |  98  |  4.6<L>  ----------------------------<  106<H>  3.6   |  23.0  |  0.51    Ca    8.9      05 Jul 2021 07:14    TPro  6.9  /  Alb  2.5<L>  /  TBili  0.8  /  DBili  x   /  AST  87<H>  /  ALT  86<H>  /  AlkPhos  223<H>  07-05          RADIOLOGY & ADDITIONAL TESTS:

## 2021-07-06 ENCOUNTER — TRANSCRIPTION ENCOUNTER (OUTPATIENT)
Age: 66
End: 2021-07-06

## 2021-07-06 ENCOUNTER — RESULT REVIEW (OUTPATIENT)
Age: 66
End: 2021-07-06

## 2021-07-06 DIAGNOSIS — J90 PLEURAL EFFUSION, NOT ELSEWHERE CLASSIFIED: ICD-10-CM

## 2021-07-06 LAB
ALBUMIN SERPL ELPH-MCNC: 2.7 G/DL — LOW (ref 3.3–5.2)
ALP SERPL-CCNC: 229 U/L — HIGH (ref 40–120)
ALT FLD-CCNC: 89 U/L — HIGH
ANION GAP SERPL CALC-SCNC: 12 MMOL/L — SIGNIFICANT CHANGE UP (ref 5–17)
AST SERPL-CCNC: 83 U/L — HIGH
B PERT IGG+IGM PNL SER: ABNORMAL
BILIRUB SERPL-MCNC: 0.6 MG/DL — SIGNIFICANT CHANGE UP (ref 0.4–2)
BUN SERPL-MCNC: 5.2 MG/DL — LOW (ref 8–20)
CALCIUM SERPL-MCNC: 9 MG/DL — SIGNIFICANT CHANGE UP (ref 8.6–10.2)
CHLORIDE SERPL-SCNC: 96 MMOL/L — LOW (ref 98–107)
CO2 SERPL-SCNC: 26 MMOL/L — SIGNIFICANT CHANGE UP (ref 22–29)
COLOR FLD: ABNORMAL
CREAT SERPL-MCNC: 0.47 MG/DL — LOW (ref 0.5–1.3)
FLUID INTAKE SUBSTANCE CLASS: SIGNIFICANT CHANGE UP
FLUID SEGMENTED GRANULOCYTES: 89 % — SIGNIFICANT CHANGE UP
GLUCOSE SERPL-MCNC: 98 MG/DL — SIGNIFICANT CHANGE UP (ref 70–99)
HCT VFR BLD CALC: 35.6 % — SIGNIFICANT CHANGE UP (ref 34.5–45)
HGB BLD-MCNC: 11.7 G/DL — SIGNIFICANT CHANGE UP (ref 11.5–15.5)
LYMPHOCYTES # FLD: 5 % — SIGNIFICANT CHANGE UP
MCHC RBC-ENTMCNC: 31.4 PG — SIGNIFICANT CHANGE UP (ref 27–34)
MCHC RBC-ENTMCNC: 32.9 GM/DL — SIGNIFICANT CHANGE UP (ref 32–36)
MCV RBC AUTO: 95.4 FL — SIGNIFICANT CHANGE UP (ref 80–100)
MESOTHL CELL # FLD: 6 % — SIGNIFICANT CHANGE UP
MONOS+MACROS # FLD: 1 % — SIGNIFICANT CHANGE UP
PH FLD: 8 — SIGNIFICANT CHANGE UP
PLATELET # BLD AUTO: 534 K/UL — HIGH (ref 150–400)
POTASSIUM SERPL-MCNC: 3.7 MMOL/L — SIGNIFICANT CHANGE UP (ref 3.5–5.3)
POTASSIUM SERPL-SCNC: 3.7 MMOL/L — SIGNIFICANT CHANGE UP (ref 3.5–5.3)
PROT SERPL-MCNC: 6.6 G/DL — SIGNIFICANT CHANGE UP (ref 6.6–8.7)
RBC # BLD: 3.73 M/UL — LOW (ref 3.8–5.2)
RBC # FLD: 12.7 % — SIGNIFICANT CHANGE UP (ref 10.3–14.5)
RCV VOL RI: HIGH /UL (ref 0–0)
SODIUM SERPL-SCNC: 134 MMOL/L — LOW (ref 135–145)
TOTAL NUCLEATED CELL COUNT, BODY FLUID: SIGNIFICANT CHANGE UP /UL
TUBE TYPE: SIGNIFICANT CHANGE UP
WBC # BLD: 10.64 K/UL — HIGH (ref 3.8–10.5)
WBC # FLD AUTO: 10.64 K/UL — HIGH (ref 3.8–10.5)

## 2021-07-06 PROCEDURE — 88305 TISSUE EXAM BY PATHOLOGIST: CPT | Mod: 26

## 2021-07-06 PROCEDURE — 71275 CT ANGIOGRAPHY CHEST: CPT | Mod: 26

## 2021-07-06 PROCEDURE — 88112 CYTOPATH CELL ENHANCE TECH: CPT | Mod: 26

## 2021-07-06 PROCEDURE — 99222 1ST HOSP IP/OBS MODERATE 55: CPT

## 2021-07-06 PROCEDURE — 32557 INSERT CATH PLEURA W/ IMAGE: CPT

## 2021-07-06 PROCEDURE — 99233 SBSQ HOSP IP/OBS HIGH 50: CPT

## 2021-07-06 PROCEDURE — 99251: CPT | Mod: 25

## 2021-07-06 PROCEDURE — 71045 X-RAY EXAM CHEST 1 VIEW: CPT | Mod: 26

## 2021-07-06 RX ORDER — AMLODIPINE BESYLATE 2.5 MG/1
1 TABLET ORAL
Qty: 30 | Refills: 0
Start: 2021-07-06 | End: 2021-08-04

## 2021-07-06 RX ORDER — CEFPODOXIME PROXETIL 100 MG
1 TABLET ORAL
Qty: 10 | Refills: 0
Start: 2021-07-06 | End: 2021-07-10

## 2021-07-06 RX ORDER — CEFTRIAXONE 500 MG/1
INJECTION, POWDER, FOR SOLUTION INTRAMUSCULAR; INTRAVENOUS
Refills: 0 | Status: DISCONTINUED | OUTPATIENT
Start: 2021-07-06 | End: 2021-07-06

## 2021-07-06 RX ORDER — SACCHAROMYCES BOULARDII 250 MG
250 POWDER IN PACKET (EA) ORAL
Refills: 0 | Status: DISCONTINUED | OUTPATIENT
Start: 2021-07-06 | End: 2021-07-09

## 2021-07-06 RX ORDER — CEFTRIAXONE 500 MG/1
1000 INJECTION, POWDER, FOR SOLUTION INTRAMUSCULAR; INTRAVENOUS ONCE
Refills: 0 | Status: COMPLETED | OUTPATIENT
Start: 2021-07-06 | End: 2021-07-06

## 2021-07-06 RX ORDER — CEFTRIAXONE 500 MG/1
2000 INJECTION, POWDER, FOR SOLUTION INTRAMUSCULAR; INTRAVENOUS EVERY 24 HOURS
Refills: 0 | Status: DISCONTINUED | OUTPATIENT
Start: 2021-07-06 | End: 2021-07-09

## 2021-07-06 RX ADMIN — Medication 100 MILLIGRAM(S): at 21:46

## 2021-07-06 RX ADMIN — AMLODIPINE BESYLATE 5 MILLIGRAM(S): 2.5 TABLET ORAL at 05:27

## 2021-07-06 RX ADMIN — AZITHROMYCIN 255 MILLIGRAM(S): 500 TABLET, FILM COATED ORAL at 08:10

## 2021-07-06 RX ADMIN — Medication 650 MILLIGRAM(S): at 21:47

## 2021-07-06 RX ADMIN — Medication 100 MILLIGRAM(S): at 12:00

## 2021-07-06 RX ADMIN — CEFTRIAXONE 100 MILLIGRAM(S): 500 INJECTION, POWDER, FOR SOLUTION INTRAMUSCULAR; INTRAVENOUS at 11:57

## 2021-07-06 RX ADMIN — Medication 250 MILLIGRAM(S): at 17:06

## 2021-07-06 RX ADMIN — Medication 650 MILLIGRAM(S): at 22:40

## 2021-07-06 RX ADMIN — ENOXAPARIN SODIUM 40 MILLIGRAM(S): 100 INJECTION SUBCUTANEOUS at 11:57

## 2021-07-06 RX ADMIN — CEFTRIAXONE 100 MILLIGRAM(S): 500 INJECTION, POWDER, FOR SOLUTION INTRAMUSCULAR; INTRAVENOUS at 09:35

## 2021-07-06 RX ADMIN — Medication 100 MILLIGRAM(S): at 05:27

## 2021-07-06 NOTE — DISCHARGE NOTE PROVIDER - NSDCMRMEDTOKEN_GEN_ALL_CORE_FT
amLODIPine 5 mg oral tablet: 1 tab(s) orally once a day  cefpodoxime 200 mg oral tablet: 1 tab(s) orally 2 times a day for 5 days please complete full course   amLODIPine 5 mg oral tablet: 1 tab(s) orally once a day   amLODIPine 5 mg oral tablet: 1 tab(s) orally once a day  amoxicillin-clavulanate 875 mg-125 mg oral tablet: 1 tab(s) orally 2 times a day  saccharomyces boulardii lyo 250 mg oral capsule: 1 cap(s) orally 2 times a day

## 2021-07-06 NOTE — DISCHARGE NOTE PROVIDER - PROVIDER TOKENS
FREE:[LAST:[Primary Care Provider],PHONE:[(   )    -],FAX:[(   )    -]] FREE:[LAST:[Primary Care Provider],PHONE:[(   )    -],FAX:[(   )    -]],PROVIDER:[TOKEN:[[id number]],FOLLOWUP:[1 month]] PROVIDER:[TOKEN:[[id number]],FOLLOWUP:[1 month]],FREE:[LAST:[Primary Care Provider],PHONE:[(   )    -],FAX:[(   )    -]],PROVIDER:[TOKEN:[[id number]],FOLLOWUP:[2 weeks]]

## 2021-07-06 NOTE — CONSULT NOTE ADULT - ASSESSMENT
This 67 yo F w/ no PMHx, no MD f/u presents to ER for 2-3 days of fevers, chills and right sided chest pain, worse on inspiration. minimal cough.  took tylenol at home w/o improvement. denies GI complaints.  in ER, febrile, tachy with tachypnea, leukocytosis and RLL infiltrate on CXR. (02 Jul 2021 12:21)    She was admitted on 7/2/21 for sepsis secondary to right lower lobe community acquired pneumonia with acute hypoxic respiratory failure on 2 liters.  She had a CTA chest which ruled out PE.  A large right sided effusion was noted and - CT surgery called.  IN total, she had received  IV Rocephin and azithromycin day x 5.  She still has a non-productive cough.   ID consult for recommendations.    patient denies travel.   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    Immediately prior to ID consultation, CT surgery team had placed a right sided chest tube.  light yellow fluid from drainage.     Impression:  Right lobar pnuemonia  Right effussion  right lung atelectasis  cough  WBC elevation      Plan:  Patient had received 5 days of coverage for CAP  cough likely from atelectasis due to (likely parapneumonic) pleural effusion    - now s/p right chest tube on 7/6/21  - follow up all outstanding cultures    WBC elevation is reactive; now normalized  - will follow and trend    change to Ceftriaxone 2 grams Q 24H x 5 more days.     - trend temperature   - repeat cultures from blood and all sources if febrile.

## 2021-07-06 NOTE — DISCHARGE NOTE PROVIDER - CARE PROVIDER_API CALL
Primary Care Provider,   Phone: (   )    -  Fax: (   )    -  Follow Up Time:    Primary Care Provider,   Phone: (   )    -  Fax: (   )    -  Follow Up Time:     Camilo Garrett)  Thoracic Surgery  [address] [place] [address]  Phone: [phone]  Fax: [phone]  Follow Up Time: 1 month   Camilo Garrett)  Thoracic Surgery  [facility], [address] [place] [address]  Phone: [phone]  Fax: [phone]  Follow Up Time: 1 month    Primary Care Provider,   Phone: (   )    -  Fax: (   )    -  Follow Up Time:     Juwan Salazar  INFECTIOUS DISEASE  [address] [place] [address]  Phone: [phone]  Fax: [phone]  Follow Up Time: 2 weeks

## 2021-07-06 NOTE — CONSULT NOTE ADULT - SUBJECTIVE AND OBJECTIVE BOX
[facility]  INFECTIOUS DISEASES AND INTERNAL MEDICINE at [place]  =======================================================  Christopher Fernandez MD  Diplomates American Board of Internal Medicine and Infectious Diseases  Tel  [phone]  Fax [phone]  =======================================================    [facility]-[id number]  SELINA RODRIGUEZ   HPI:  This 67 yo F w/ no PMHx, no MD f/u presents to ER for 2-3 days of fevers, chills and right sided chest pain, worse on inspiration. minimal cough.  took tylenol at home w/o improvement. denies GI complaints.  in ER, febrile, tachy with tachypnea, leukocytosis and RLL infiltrate on CXR. (02 Jul 2021 12:21)    She was admitted on 7/2/21 for sepsis secondary to right lower lobe community acquired pneumonia with acute hypoxic respiratory failure on 2 liters.  She had a CTA chest which ruled out PE.  A large right sided effusion was noted and - CT surgery called.  IN total, she had received  IV Rocephin and azithromycin day x 5.  She still has a non-productive cough.   ID consult for recommendations.    patient denies travel.   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    Immediately prior to ID consultation, CT surgery team had placed a right sided chest tube.  light yellow fluid from drainage.     I have personally reviewed the labs and data; pertinent labs and data are listed in this note; please see below.   =======================================================  Past Medical & Surgical Hx:  =====================  PAST MEDICAL & SURGICAL HISTORY:  No pertinent past medical history  No significant past surgical history      Problem List:  ==========  HEALTH ISSUES - PROBLEM Dx:    Social Hx:  =======  no toxic habits currently    FAMILY HISTORY:  FH: hypertension - mother  no significant family history of immunosuppressive disorders in mother or father   =======================================================    REVIEW OF SYSTEMS:  CONSTITUTIONAL:  No Fever or chills  HEENT:  No diplopia or blurred vision.  No earache, sore throat or runny nose.  CARDIOVASCULAR:  No pressure, squeezing, strangling, tightness, heaviness or aching about the chest, neck, axilla or epigastrium.  RESPIRATORY:  POSITIVE cough, shortness of breath  GASTROINTESTINAL:  No nausea, vomiting or diarrhea.  GENITOURINARY:  No dysuria, frequency or urgency. No Blood in urine  MUSCULOSKELETAL:  no joint aches, no muscle pain  SKIN:  No change in skin, hair or nails.  NEUROLOGIC:  No Headaches, seizures or weakness.  PSYCHIATRIC:  No disorder of thought or mood.  ENDOCRINE:  No heat or cold intolerance  HEMATOLOGICAL:  No easy bruising or bleeding.    =======================================================  Allergies  No Known Allergies      Antibiotics:  cefTRIAXone   IVPB        Other medications:  amLODIPine   Tablet 5 milliGRAM(s) Oral daily  benzonatate 100 milliGRAM(s) Oral every 8 hours  enoxaparin Injectable 40 milliGRAM(s) SubCutaneous daily  saccharomyces boulardii 250 milliGRAM(s) Oral two times a day     azithromycin  IVPB   255 mL/Hr IV Intermittent (07-02-21 @ 08:23)   255 mL/Hr IV Intermittent (07-03-21 @ 08:45)   255 mL/Hr IV Intermittent (07-04-21 @ 08:15)   255 mL/Hr IV Intermittent (07-05-21 @ 12:51)   255 mL/Hr IV Intermittent (07-06-21 @ 08:10)    cefTRIAXone   IVPB   100 mL/Hr IV Intermittent (07-02-21 @ 07:45)   100 mL/Hr IV Intermittent (07-03-21 @ 10:18)   100 mL/Hr IV Intermittent (07-04-21 @ 10:07)   100 mL/Hr IV Intermittent (07-05-21 @ 15:10)   100 mL/Hr IV Intermittent (07-06-21 @ 09:35)   100 mL/Hr IV Intermittent (07-06-21 @ 11:57)      ======================================================  Physical Exam:  ============  T(F): 98.3 (06 Jul 2021 12:06), Max: 98.6 (06 Jul 2021 04:35)  HR: 90 (06 Jul 2021 12:06)  BP: 124/87 (06 Jul 2021 12:06)  RR: 18 (06 Jul 2021 12:06)  SpO2: 98% (06 Jul 2021 12:06) (94% - 98%)  temp max in last 48H T(F): , Max: 99.5 (07-04-21 @ 17:10)    General:  No acute distress.  THIN  Eye: Pupils are equal, round and reactive to light, Extraocular movements are intact, Normal conjunctiva.  HENT: Normocephalic, Oral mucosa is moist, No pharyngeal erythema, No sinus tenderness.  Neck: Supple, No lymphadenopathy.  Respiratory: Lungs with crackles at RIGHT mid zone.  poor aeration at right base.   LEFT lung zone with good air entry  RIGHT sided chest tube in place; to [place]  Cardiovascular: Normal rate, Regular rhythm,   Gastrointestinal: Soft, Non-tender, Non-distended, Normal bowel sounds.  Genitourinary: No costovertebral angle tenderness.  Lymphatics: No lymphadenopathy neck,   Musculoskeletal: Normal range of motion, Normal strength.  Integumentary: No rash.  Neurologic: Alert, Oriented, No focal deficits, Cranial Nerves II-XII are grossly intact.  Psychiatric: Appropriate mood & affect.    =======================================================  Labs:                        11.7   10.64 )-----------( 534      ( 06 Jul 2021 08:32 )             35.6   WBC Count: 10.64 K/uL (07-06-21 @ 08:32)  WBC Count: 12.34 K/uL (07-05-21 @ 07:14)  WBC Count: 16.99 K/uL (07-04-21 @ 08:56)  WBC Count: 16.53 K/uL (07-03-21 @ 10:26)  WBC Count: 15.56 K/uL (07-02-21 @ 07:48)      07-06    134<L>  |  96<L>  |  5.2<L>  ----------------------------<  98  3.7   |  26.0  |  0.47<L>    Ca    9.0      06 Jul 2021 08:32    TPro  6.6  /  Alb  2.7<L>  /  TBili  0.6  /  DBili  x   /  AST  83<H>  /  ALT  89<H>  /  AlkPhos  229<H>  07-06      Culture - Urine (collected 07-03-21 @ 01:24)  Source: .Urine Clean Catch (Midstream)  Final Report (07-03-21 @ 22:26):    <10,000 CFU/mL Normal Urogenital Toya    Culture - Blood (collected 07-02-21 @ 08:06)  Source: .Blood Blood-Peripheral    Culture - Blood (collected 07-02-21 @ 08:05)  Source: .Blood Blood-Peripheral      Creatinine, Serum: 0.47 mg/dL (07-06-21 @ 08:32)  Creatinine, Serum: 0.51 mg/dL (07-05-21 @ 07:14)  Creatinine, Serum: 0.47 mg/dL (07-04-21 @ 08:56)  Creatinine, Serum: 0.44 mg/dL (07-03-21 @ 10:26)  Creatinine, Serum: 0.75 mg/dL (07-02-21 @ 07:48)         SARS-CoV-2: NotDetec (07-02-21 @ 08:16)  Rapid RVP Result: NotDetec (07-02-21 @ 08:16)      < from: CT Angio Chest PE Protocol w/ IV Cont (07.06.21 @ 12:45) >     EXAM:  CT ANGIO CHEST PULM RUFINA GONZALEZ                          PROCEDURE DATE:  07/06/2021          INTERPRETATION:  CLINICAL INFORMATION: Hypoxia    COMPARISON: 7/3/2021    CONTRAST/COMPLICATIONS:  IV Contrast: Omnipaque 350  96 cc administered   4 cc discarded  Oral Contrast: NONE  Complications: None reported at time of study completion    PROCEDURE:  CT Angiography of the Chest.  Sagittal and coronal reformats were performed as well as 3D (MIP) reconstructions.    FINDINGS:    LUNGS AND AIRWAYS: Patent central airways.  Near complete compressive atelectasis of the right lower lobe. Partial atelectasis of the posterior right middle lobe and posterior right upper lobe. Left basilar compressive atelectasis. Persistent nodular density in the left upper lobe may represent pneumonia however short-term follow-up to resolution is recommended to exclude underlying malignancy.  PLEURA: Moderate right pleural effusion. Trace left pleural effusion.  MEDIASTINUM AND NUZHAT: No lymphadenopathy.  VESSELS: No definite evidence of acute pulmonary embolism. No aortic aneurysm.  HEART: Heart is mildly prominent. No pericardial effusion.  CHEST WALL AND LOWER NECK: Within normal limits.  VISUALIZED UPPER ABDOMEN: Mild hepatomegaly. Mild elevation of the right hemidiaphragm.  Contracted gallbladder.  BONES: Degenerative changes.    IMPRESSION:  No definite evidence of acute pulmonary embolism  Moderate right pleural effusion with near complete compressive atelectasis of the right lower lobe. Partial atelectasis of the posterior right middle lobe and posterior right upper lobe. Trace left pleural effusion with left basilar compressive atelectasis  Persistent nodular density in the left upper lobe may represent pneumonia however short-term follow-up to resolution is recommended to exclude underlying malignancy.    --- End of Report ---         MIGUEL SHELTON MD; Attending Radiologist  This document has been electronically signed. Jul 6 2021  1:09PM    < end of copied text >  
HPI - 65 yo F w/ no PMHx, no MD f/u presents to ER for 2-3 days of fevers, chills and right sided chest pain, worse on inspiration. minimal cough.  took tylenol at home w/o improvement.   In ER, febrile, tachy with tachypnea, leukocytosis and RLL infiltrate on CXR. Treated for suspected pneumonia. CT scan with RT sided pleural effusion and compressive atelectasis, suspicious for parapneumonic effusion. CT surgery consulted for drainage of RT sided pleural effusion.     Subjective - patient seen and evaluated bedside. Sitting comfortably in bed. Admits to SOB and chest wall pain over right upper chest wall near apex. Denies HA, dizziness, n/v/d    Review of Systems: negative x 10 systems except as noted above    Brief summary:    Significant/[id number] events: increasing O2 requirements. RT pigtail catheter placed.       PAST MEDICAL & SURGICAL HISTORY:  No pertinent past medical history    No significant past surgical history    No significant past surgical history          acetaminophen   Tablet .. 650 milliGRAM(s) Oral every 6 hours PRN  amLODIPine   Tablet 5 milliGRAM(s) Oral daily  benzonatate 100 milliGRAM(s) Oral every 8 hours  cefTRIAXone   IVPB      enoxaparin Injectable 40 milliGRAM(s) SubCutaneous daily  guaiFENesin Oral Liquid (Sugar-Free) 200 milliGRAM(s) Oral every 6 hours PRN  ondansetron Injectable 4 milliGRAM(s) IV Push four times a day PRN  saccharomyces boulardii 250 milliGRAM(s) Oral two times a day  traMADol 50 milliGRAM(s) Oral three times a day PRN  MEDICATIONS  (PRN):  acetaminophen   Tablet .. 650 milliGRAM(s) Oral every 6 hours PRN Temp greater or equal to 38C (100.4F), Mild Pain (1 - 3), Moderate Pain (4 - 6)  guaiFENesin Oral Liquid (Sugar-Free) 200 milliGRAM(s) Oral every 6 hours PRN Cough  ondansetron Injectable 4 milliGRAM(s) IV Push four times a day PRN Nausea and/or Vomiting  traMADol 50 milliGRAM(s) Oral three times a day PRN Severe Pain (7 - 10)      Daily     Daily                               11.7   10.64 )-----------( 534      ( 06 Jul 2021 08:32 )             35.6   07-06    134<L>  |  96<L>  |  5.2<L>  ----------------------------<  98  3.7   |  26.0  |  0.47<L>    Ca    9.0      06 Jul 2021 08:32    TPro  6.6  /  Alb  2.7<L>  /  TBili  0.6  /  DBili  x   /  AST  83<H>  /  ALT  89<H>  /  AlkPhos  229<H>  07-06            Objective:  T(C): 36.8 (07-06-21 @ 12:06), Max: 37 (07-06-21 @ 04:35)  HR: 90 (07-06-21 @ 12:06) (90 - 94)  BP: 124/87 (07-06-21 @ 12:06) (121/77 - 149/83)  RR: 18 (07-06-21 @ 12:06) (18 - 18)  SpO2: 98% (07-06-21 @ 12:06) (94% - 98%)  Wt(kg): --CAPILLARY BLOOD GLUCOSE      I&O's Summary      Physical Exam  Neuro: A+O x 3, non-focal, speech clear and intact  Pulm: CTA on right. Diminished at left base.   CV: RRR, +S1S2  Abd: soft, NT, ND, +BS  Ext: +DP Pulses b/l,  no edema  Chest tubes: RT pleural chest tube in place, dressing c/d/i, no air leaks. draining serous fluid.     Imaging:    < from: CT Angio Chest PE Protocol w/ IV Cont (07.06.21 @ 12:45) >  IMPRESSION:  No definite evidence of acute pulmonary embolism    Moderate right pleural effusion with near complete compressive atelectasis of the right lower lobe. Partial atelectasis of the posterior right middle lobe and posterior right upper lobe. Trace left pleural effusion with left basilar compressive atelectasis    Persistent nodular density in the left upper lobe may represent pneumonia however short-term follow-up to resolution is recommended to exclude underlying malignancy.    --- End of Report ---      Post-procedural CXR pending.

## 2021-07-06 NOTE — PROGRESS NOTE ADULT - ASSESSMENT
The patient is a 66 year old female with no significant past medical history who presented to the Er with complaints of fever, chills and right sided chest pain. Admitted for sepsis secondary to right lower lobe pneumonia.     Assessment/Plan:    1. Sepsis secondary to right lower lobe community acquired pneumonia with acute hypoxic respiratory failure on 2 liters:  Spo2 on room air with ambulation dropped to 84%. remains acute   IV Rocephin and azithromycin day 5  ID consult for recommendations   COVID negative  RVP negative  CT Chest with IV contrast with small foci of bilateral pneumonia moderate bilateral pleural effusion suspect parapneumonic   Blood cultures x 2 negative  Urine culture negative   Incentive spirometry   Encourage ambulation out of bed    2, Hyponatremia with metabolic acidosis  Improved with IV hydration   Monitor bMP    3. Transaminitis Elevated LFTS  with right UQ pain: Abdominal ultrasound was normal  Trend LFTS    4. Hypophosphatemia Supplement Phos   Follow up Phos in am     5. Hypertension: Has not seen a physician in >30 years  Echocardiogram with normal LVSF grade 1 diastolic dysfunction  BP improved wtih  Norvasc 5 mg PO OD  Monitor BP        VTE Lovenox subcut  The patient is a 66 year old female with no significant past medical history who presented to the Er with complaints of fever, chills and right sided chest pain. Admitted for sepsis secondary to right lower lobe pneumonia.     Assessment/Plan:    1. Sepsis secondary to right lower lobe community acquired pneumonia with acute hypoxic respiratory failure on 2 liters:  Spo2 on room air with ambulation dropped to 84%. remains acute  STAT CTA of the chest to rule out P E   IV Rocephin and azithromycin day 5  ID consult for recommendations   COVID negative  RVP negative  CT Chest with IV contrast with small foci of bilateral pneumonia moderate bilateral pleural effusion suspect parapneumonic   Blood cultures x 2 negative  Urine culture negative   Incentive spirometry   Encourage ambulation out of bed    2, Hyponatremia with metabolic acidosis  Improved with IV hydration   Monitor bMP    3. Transaminitis Elevated LFTS  with right UQ pain: Abdominal ultrasound was normal  Trend LFTS    4. Hypophosphatemia Supplement Phos   Follow up Phos in am     5. Hypertension: Has not seen a physician in >30 years  Echocardiogram with normal LVSF grade 1 diastolic dysfunction  BP improved wtih  Norvasc 5 mg PO OD  Monitor BP        VTE Lovenox subcut  The patient is a 66 year old female with no significant past medical history who presented to the Er with complaints of fever, chills and right sided chest pain. Admitted for sepsis secondary to right lower lobe pneumonia.     Assessment/Plan:    1. Sepsis secondary to right lower lobe community acquired pneumonia with acute hypoxic respiratory failure on 2 liters:  Spo2 on room air with ambulation dropped to 84%. remains acute  CTA chest ruled out PE- CT surgery called to for moderate right pleural effusion causing compressive atelectasis    IV Rocephin and azithromycin day 5  ID consult for recommendations   COVID negative  RVP negative  CT Chest with IV contrast with small foci of bilateral pneumonia moderate bilateral pleural effusion suspect parapneumonic   Blood cultures x 2 negative  Urine culture negative   Incentive spirometry   Encourage ambulation out of bed    2, Hyponatremia with metabolic acidosis  Improved with IV hydration   Monitor bMP    3. Transaminitis Elevated LFTS  with right UQ pain: Abdominal ultrasound was normal  Trend LFTS    4. Hypophosphatemia Supplement Phos   Follow up Phos in am     5. Hypertension: Has not seen a physician in >30 years  Echocardiogram with normal LVSF grade 1 diastolic dysfunction  BP improved wtih  Norvasc 5 mg PO OD  Monitor BP        VTE Lovenox subcut

## 2021-07-06 NOTE — PROGRESS NOTE ADULT - SUBJECTIVE AND OBJECTIVE BOX
CC: Follow up    INTERVAL HPI/OVERNIGHT EVENTS: Patient seen and examined, still has non productive cough with right sided chest discomfort with deep inspiration       Vital Signs Last 24 Hrs  T(C): 37 (06 Jul 2021 04:35), Max: 37 (06 Jul 2021 04:35)  T(F): 98.6 (06 Jul 2021 04:35), Max: 98.6 (06 Jul 2021 04:35)  HR: 94 (06 Jul 2021 04:35) (94 - 94)  BP: 121/77 (06 Jul 2021 04:35) (121/77 - 149/83)  BP(mean): --  RR: 18 (06 Jul 2021 04:35) (18 - 18)  SpO2: 98% (06 Jul 2021 04:35) (94% - 98%)    PHYSICAL EXAM:    GENERAL: NAD, AOX3  HEAD:  Atraumatic, Normocephalic  EYES: EOMI, PERRLA, conjunctiva and sclera clear  ENMT: Moist mucous membranes  NECK: Supple, No JVD  CHEST/LUNG: Decreased breath sounds bilateral bases  HEART: Regular rate and rhythm; No murmurs, rubs, or gallops  ABDOMEN: Soft, Nontender, Nondistended; Bowel sounds present  EXTREMITIES:  2+ Peripheral Pulses, No clubbing, cyanosis, or edema        MEDICATIONS  (STANDING):  amLODIPine   Tablet 5 milliGRAM(s) Oral daily  benzonatate 100 milliGRAM(s) Oral every 8 hours  enoxaparin Injectable 40 milliGRAM(s) SubCutaneous daily    MEDICATIONS  (PRN):  acetaminophen   Tablet .. 650 milliGRAM(s) Oral every 6 hours PRN Temp greater or equal to 38C (100.4F), Mild Pain (1 - 3), Moderate Pain (4 - 6)  guaiFENesin Oral Liquid (Sugar-Free) 200 milliGRAM(s) Oral every 6 hours PRN Cough  ondansetron Injectable 4 milliGRAM(s) IV Push four times a day PRN Nausea and/or Vomiting  traMADol 50 milliGRAM(s) Oral three times a day PRN Severe Pain (7 - 10)      Allergies    No Known Allergies    Intolerances          LABS:                          11.7   10.64 )-----------( 534      ( 06 Jul 2021 08:32 )             35.6     07-06    134<L>  |  96<L>  |  5.2<L>  ----------------------------<  98  3.7   |  26.0  |  0.47<L>    Ca    9.0      06 Jul 2021 08:32    TPro  6.6  /  Alb  2.7<L>  /  TBili  0.6  /  DBili  x   /  AST  83<H>  /  ALT  89<H>  /  AlkPhos  229<H>  07-06          RADIOLOGY & ADDITIONAL TESTS:

## 2021-07-06 NOTE — DISCHARGE NOTE PROVIDER - NSDCCPCAREPLAN_GEN_ALL_CORE_FT
PRINCIPAL DISCHARGE DIAGNOSIS  Diagnosis: PNA (pneumonia)  Assessment and Plan of Treatment: During your hospitalization you were treated for pneumonia with IV antibiotics for 5 days, will continue oral antibiotics outpatient for 5 more days, please complete course. Please establish care with your outpatient primary care provider to follow up with hospitalization.      SECONDARY DISCHARGE DIAGNOSES  Diagnosis: Essential hypertension  Assessment and Plan of Treatment: During your hospitalization you were found to have hypertension, you were started on Norvasc. Please establish care with your outpatient primary care provider to follow up with hypertension.     PRINCIPAL DISCHARGE DIAGNOSIS  Diagnosis: PNA (pneumonia)  Assessment and Plan of Treatment: During your hospitalization you were treated for community acquired bacterial pneumonia with IV antibiotics which will continue through 7/11. Please establish care with your outpatient primary care provider to follow up with hospitalization.      SECONDARY DISCHARGE DIAGNOSES  Diagnosis: Essential hypertension  Assessment and Plan of Treatment: During your hospitalization you were found to have hypertension, you were started on Norvasc. Please establish care with your outpatient primary care provider to follow up with hypertension.    Diagnosis: Pleural effusion  Assessment and Plan of Treatment: During your hospitalization you had a chest tube placed on your right side to drain fluid from your lung. Please follow up with thoracic surgery (Dr. Garrett) for repeat chest x-ray.     PRINCIPAL DISCHARGE DIAGNOSIS  Diagnosis: PNA (pneumonia)  Assessment and Plan of Treatment: During your hospitalization you were treated for community acquired bacterial pneumonia with IV antibiotics   Please establish care with your outpatient primary care provider to follow up with hospitalization.      SECONDARY DISCHARGE DIAGNOSES  Diagnosis: Essential hypertension  Assessment and Plan of Treatment: During your hospitalization you were found to have hypertension, you were started on Norvasc. Please establish care with your outpatient primary care provider to follow up with hypertension.    Diagnosis: Pleural effusion  Assessment and Plan of Treatment: During your hospitalization you had a chest tube placed on your right side to drain fluid from your lung. Please follow up with thoracic surgery (Dr. Garrett) for repeat chest x-ray.    Diagnosis: Acute respiratory failure with hypoxia  Assessment and Plan of Treatment:      PRINCIPAL DISCHARGE DIAGNOSIS  Diagnosis: PNA (pneumonia)  Assessment and Plan of Treatment: During your hospitalization you were treated for community acquired bacterial pneumonia with IV antibiotics   PO augmentin BID with a probiotic   Please establish care with your outpatient primary care provider to follow up with hospitalization.      SECONDARY DISCHARGE DIAGNOSES  Diagnosis: Essential hypertension  Assessment and Plan of Treatment: During your hospitalization you were found to have hypertension, you were started on Norvasc. Please establish care with your outpatient primary care provider to follow up with hypertension.    Diagnosis: Pleural effusion  Assessment and Plan of Treatment: During your hospitalization you had a chest tube placed on your right side to drain fluid from your lung. Please follow up with thoracic surgery (Dr. Garrett) for repeat chest x-ray.

## 2021-07-06 NOTE — DISCHARGE NOTE PROVIDER - CARE PROVIDERS DIRECT ADDRESSES
,DirectAddress_Unknown ,DirectAddress_Unknown,[email or web address] ,[email or web address],DirectAddress_Unknown,[email or web address]

## 2021-07-06 NOTE — CHART NOTE - NSCHARTNOTEFT_GEN_A_CORE
Patient seen this morning for ambulatory oximetry  patient O2Sat at rest on RA was 91%  during ambulation patient desaturated to 84% on RA  patient was rested, and placed on 2LNC. O2Sat improved to 93% at rest  Patient titrated to LNC on ambulation, and sustained an O2Sat of %.   At this time, patient will require homeO2 upon discharge  will discuss with CM to set up homeO2 for patient Patient seen this morning for ambulatory oximetry  patient O2Sat at rest on RA was 91%  during ambulation patient desaturated to 84% on RA  patient was rested, and placed on 2LNC. O2Sat improved to 93% at rest  Patient titrated to LNC on ambulation, and sustained an O2Sat of %.   given this an acute process will discuss with ID if need to broaden abx coverage Patient seen this morning for ambulatory oximetry  patient O2Sat at rest on RA was 91%  during ambulation patient desaturated to 84% on RA  patient was rested, and placed on 2LNC. O2Sat improved to 93% at rest  given this an acute process will discuss with ID if need to broaden abx coverage

## 2021-07-06 NOTE — PROCEDURE NOTE - NSPROCDETAILS_GEN_ALL_CORE
Seldinger technique/dressing applied/secured in place/sterile dressing applied/percutaneous/thoracostomy tube placed percutaneously

## 2021-07-06 NOTE — DISCHARGE NOTE PROVIDER - HOSPITAL COURSE
65 yo F w/ no significant PMHx presented to the ER with complaints of R sided CP with inspiration, SOB and fever x 3 days. EKG unremarkbale. Upon arrival had O2 Sat of 89% on RA, was placed on 3L NC. Sepsis protocol was initiated. CXR was positive for right lower lobe pneumonia and IV Rocephin and Azithromycin were started. Patient was admitted to Med service for further care. Blood cultures x 2 negative, patient on day 5 of IV antibiotics will transition to PO Vantin 200mg BID for remaining 5 days. Trial to remove 2L NC was completed, patient tolerated well at rest and with ambulation. Patient found to have transaminitis during stay, abdominal US WNL. Elevated LFTs attributed to septic presentation. Patient with new onset hypertension, echocardiogram with normal LVSF grade 1 diastolic dysfunction, started PO Norvasc 5mg QD with good response. Patient feels well with no new complaints and is medically cleared for discharge home.     CONSTITUTIONAL: (-) fever, (-)chills  RESPIRATORY: (-) SOB, (-) cough  CARDIOVASCULAR: (-) chest pain, (-) palpitations  GASTROINTESTINAL: (-) abdominal pain, (-) nausea, (-) vomiting , (-) diarrhea   NEUROLOGICAL: (-) headaches,  (-) weakness  MISCELLANEOUS: (-) joint swelling, (-) joint pain    PHYSICAL EXAM:    Vital Signs Last 24 Hrs  T(F): 98.6   HR: 94   BP: 121/77   RR: 18   SpO2: 98%    GENERAL: Pt lying comfortably, NAD.  ENMT: PERRL, +EOMI.  NECK: soft, Supple, No JVD,   CHEST/LUNG: Clear to auscultatation bilaterally; No wheezing.  HEART: S1S2+, Regular rate and rhythm; No murmurs.  ABDOMEN: Soft, Nontender, Nondistended; Bowel sounds present.  MUSCULOSKELETAL: Normal range of motion.  SKIN: No rashes or lesions.  NEURO: AAOX3, no focal deficits, no motor or sensory loss.  PSYCH: normal mood.       65 yo F w/ no significant PMHx presented to the ER with complaints of R sided CP with inspiration, SOB and fever x 3 days. EKG unremarkbale. Upon arrival had O2 Sat of 89% on RA, was placed on 3L NC. Sepsis protocol was initiated. CXR was positive for right lower lobe pneumonia and IV Rocephin and Azithromycin were started. Patient was admitted to Med service for further care. Blood cultures x 2 negative, patient on day 5 of IV antibiotics will transition to PO Vantin 200mg BID for remaining 5 days. Patient required supplemental oxygen, ambulatory pulse oximetry __. Patient found to have transaminitis during stay, abdominal US WNL. Elevated LFTs attributed to septic presentation. Patient with new onset hypertension, echocardiogram with normal LVSF grade 1 diastolic dysfunction, started PO Norvasc 5mg QD with good response. Patient feels well with no new complaints and is medically cleared for discharge home.     CONSTITUTIONAL: (-) fever, (-)chills  RESPIRATORY: (-) SOB, (+) cough, (+) chest pain with deep inspiration  CARDIOVASCULAR:  (-) palpitations  GASTROINTESTINAL: (-) abdominal pain, (-) nausea, (-) vomiting , (-) diarrhea   NEUROLOGICAL: (-) headaches,  (-) weakness  MISCELLANEOUS: (-) joint swelling, (-) joint pain    PHYSICAL EXAM:    Vital Signs Last 24 Hrs  T(F): 98.6   HR: 94   BP: 121/77   RR: 18   SpO2: 98%    GENERAL: Patient sitting up comfortably in bed, NAD, A&Ox3.  ENMT: PERRL, +EOMI.  NECK: soft, Supple, No JVD  CHEST/LUNG: Right lung base with rhonchi, unlabored respirations on RA  HEART: S1S2+, Regular rate and rhythm; No murmurs.  ABDOMEN: Soft, Nontender, Nondistended; Bowel sounds present.  EXT: SIDDIQUI x4, no edema         65 yo F w/ no significant PMHx presented to [facility] ED with complaints of R sided CP with inspiration, SOB and fever x 3 days.  In the ED, EKG unremarkable, noted to be hypoxic withg O2Sat of 89% on RA, was placed on 3 LNC. Sepsis protocol was initiated, , Tmax 101,lactate 3.1, wbc 15. patient received  2L bolus IVF. CXR was positive for RLL  pneumonia and IV Rocephin and Azithromycin were started. Patient was admitted to Medicine service for further care.  Repeat lactate normalized, and sepsis had resolved. Blood cultures x 2 negative, patient s/p 5 days of IV antibiotics and will transition to PO Vantin 200mg BID upon discharge for a total course of 10 days. Attempted to wean patient off supplemental oxygen, and ambulatory pulse oximetry was performed. At this time patient patient's resting oxygen saturation 86% on RA and will require home oxygen. Patient found to have transaminitis during stay, abdominal US performed with no acute findings, likely elevated in the setting of sepsis. Patient with new onset hypertension, echocardiogram with normal LVSF grade 1 diastolic dysfunction, started PO Norvasc 5mg QD with good response. Patient feels well with no new complaints and is medically cleared for discharge home.     CONSTITUTIONAL: (-) fever, (-)chills  RESPIRATORY: (-) SOB, (+) cough, (+) chest pain with deep inspiration  CARDIOVASCULAR:  (-) palpitations  GASTROINTESTINAL: (-) abdominal pain, (-) nausea, (-) vomiting , (-) diarrhea   NEUROLOGICAL: (-) headaches,  (-) weakness  MISCELLANEOUS: (-) joint swelling, (-) joint pain    PHYSICAL EXAM:    Vital Signs Last 24 Hrs  T(F): 98.6   HR: 94   BP: 121/77   RR: 18   SpO2: 98%    GENERAL: Patient sitting up comfortably in bed, NAD, A&Ox3.  ENMT: PERRL, +EOMI.  NECK: soft, Supple, No JVD  CHEST/LUNG: Right lung base with rhonchi, unlabored respirations on RA  HEART: S1S2+, Regular rate and rhythm; No murmurs.  ABDOMEN: Soft, Nontender, Nondistended; Bowel sounds present.  EXT: SIDDIQUI x4, no edema         Length of discharge: 35mins 65 yo F w/ no significant PMHx presented to [facility] ED with complaints of R sided CP with inspiration, SOB and fever x 3 days.  In the ED, EKG unremarkable, noted to be hypoxic withg O2Sat of 89% on RA, was placed on 3 LNC. Sepsis protocol was initiated, , Tmax 101,lactate 3.1, wbc 15. patient received  2L bolus IVF. CXR was positive for RLL  pneumonia and IV Rocephin and Azithromycin were started. Patient was admitted to Medicine service for further care.  Repeat lactate normalized, and sepsis had resolved. Blood cultures x 2 negative, patient s/p 5 days of IV antibiotics. Attempted to wean patient off supplemental oxygen, and ambulatory pulse oximetry was performed. At this time patient patient's  ambulatory oximetry desaturated to 84%. Given acute process, ID consult for recommendation for antibiotics.  Patient found to have transaminitis during stay, abdominal US performed with no acute findings, likely elevated in the setting of sepsis. Patient with new onset hypertension, echocardiogram with normal LVSF grade 1 diastolic dysfunction, started PO Norvasc 5mg QD with good response. Patient feels well with no new complaints and is medically cleared for discharge home.     CONSTITUTIONAL: (-) fever, (-)chills  RESPIRATORY: (-) SOB, (+) cough, (+) chest pain with deep inspiration  CARDIOVASCULAR:  (-) palpitations  GASTROINTESTINAL: (-) abdominal pain, (-) nausea, (-) vomiting , (-) diarrhea   NEUROLOGICAL: (-) headaches,  (-) weakness  MISCELLANEOUS: (-) joint swelling, (-) joint pain    PHYSICAL EXAM:    Vital Signs Last 24 Hrs  T(F): 98.6   HR: 94   BP: 121/77   RR: 18   SpO2: 98%    GENERAL: Patient sitting up comfortably in bed, NAD, A&Ox3.  ENMT: PERRL, +EOMI.  NECK: soft, Supple, No JVD  CHEST/LUNG: Right lung base with rhonchi, unlabored respirations on RA  HEART: S1S2+, Regular rate and rhythm; No murmurs.  ABDOMEN: Soft, Nontender, Nondistended; Bowel sounds present.  EXT: SIDDIQUI x4, no edema     Length of discharge: 35mins 67 yo F w/ no significant PMHx presented to [facility] ED with complaints of R sided CP with inspiration, SOB and fever x 3 days.  In the ED, EKG unremarkable, noted to be hypoxic withg O2Sat of 89% on RA, was placed on 3 LNC. Sepsis protocol was initiated, , Tmax 101,lactate 3.1, wbc 15. patient received  2L bolus IVF. CXR was positive for RLL  pneumonia and IV Rocephin and Azithromycin were started. Patient was admitted to Medicine service for further care.  Repeat lactate normalized, and sepsis had resolved. Blood cultures x 2 negative, patient s/p 5 days of IV antibiotics. Attempted to wean patient off supplemental oxygen, and ambulatory pulse oximetry was performed. At this time patient's  ambulatory oximetry desaturated to 84%. Given acute process, ID consulted and recommended switch to IV Rocephin 2g Q24hrs x 5 more days, for a total course of 10 days of antibiotics. CTA completed for further evaluation of hypoxia, no evidence of PE, study revealed moderate right pleural effusion causing compressive atelectasis. CT surgery called and placed R sided pigtail chest tube for drainage of pleural effusion, no complications with proper positioning on CXR, 200cc of serous fluid drained initially and sent for cytology/pathology and culture, results pending. Patient found to have transaminitis during stay, abdominal US performed with no acute findings, likely elevated in the setting of sepsis. Patient with new onset hypertension, echocardiogram with normal LVSF grade 1 diastolic dysfunction, started PO Norvasc 5mg QD with good response. Patient feels well with no new complaints and is medically cleared for discharge home.     CONSTITUTIONAL: (-) fever, (-)chills  RESPIRATORY: (-) SOB, (+) cough, (+) chest pain with deep inspiration  CARDIOVASCULAR:  (-) palpitations  GASTROINTESTINAL: (-) abdominal pain, (-) nausea, (-) vomiting , (-) diarrhea   NEUROLOGICAL: (-) headaches,  (-) weakness  MISCELLANEOUS: (-) joint swelling, (-) joint pain    PHYSICAL EXAM:    Vital Signs Last 24 Hrs  T(F): 98.6   HR: 94   BP: 121/77   RR: 18   SpO2: 98%    GENERAL: Patient sitting up comfortably in bed, NAD, A&Ox3.  ENMT: PERRL, +EOMI.  NECK: soft, Supple, No JVD  CHEST/LUNG: Right lung base with rhonchi, unlabored respirations on RA  HEART: S1S2+, Regular rate and rhythm; No murmurs.  ABDOMEN: Soft, Nontender, Nondistended; Bowel sounds present.  EXT: SIDDIQUI x4, no edema     Length of discharge: 35mins 65 yo F w/ no significant PMHx presented to [facility] ED with complaints of R sided CP with inspiration, SOB and fever x 3 days.  In the ED, EKG unremarkable, noted to be hypoxic withg O2Sat of 89% on RA, was placed on 3 LNC. Sepsis protocol was initiated, , Tmax 101,lactate 3.1, wbc 15. patient received  2L bolus IVF. CXR was positive for RLL  pneumonia and IV Rocephin and Azithromycin were started. Patient was admitted to Medicine service for further care of community acquired bacterial pneumonia with sepsis and acute hypoxic respiratory failure. Repeat lactate normalized, and sepsis had resolved. Blood cultures x 2 negative, patient s/p 5 days of IV antibiotics. Attempted to wean patient off supplemental oxygen, and ambulatory pulse oximetry was performed. At this time patient's  ambulatory oximetry desaturated to 84%. Patient was placed back on 2L NC. Given acute process, ID consulted and recommended switch to IV Rocephin 2g Q24hrs x 5 more days (through 7/11/21), for a total course of 10 days of antibiotics. CTA completed for further evaluation of hypoxia, no evidence of PE, study revealed moderate right pleural effusion causing compressive atelectasis. CT surgery called and placed R sided pigtail chest tube for drainage of pleural effusion, no complications with proper positioning on CXR, 200cc of serous fluid drained initially and sent for cytology/pathology and culture, which showed exudative effusion. Patient found to have transaminitis during stay, abdominal US performed with no acute findings, likely elevated in the setting of sepsis. Patient with new onset hypertension, echocardiogram with normal LVSF grade 1 diastolic dysfunction, started PO Norvasc 5mg QD with good response. Patient feels well with no new complaints and is medically cleared for discharge home.     CONSTITUTIONAL: (-) fever, (-)chills  RESPIRATORY: (-) SOB, (+) cough, (+) chest pain with deep inspiration  CARDIOVASCULAR:  (-) palpitations  GASTROINTESTINAL: (-) abdominal pain, (-) nausea, (-) vomiting , (-) diarrhea   NEUROLOGICAL: (-) headaches,  (-) weakness  MISCELLANEOUS: (-) joint swelling, (-) joint pain    PHYSICAL EXAM:    Vital Signs Last 24 Hrs  T(F): 98.6   HR: 94   BP: 121/77   RR: 18   SpO2: 98%    GENERAL: Patient sitting up comfortably in bed, NAD, A&Ox3.  ENMT: PERRL, +EOMI.  NECK: soft, Supple, No JVD  CHEST/LUNG:   HEART: S1S2+, Regular rate and rhythm; No murmurs.  ABDOMEN: Soft, Nontender, Nondistended; Bowel sounds present.  EXT: SIDDIQUI x4, no edema     Length of discharge: 35mins 67 yo F w/ no significant PMHx presented to [facility] ED with complaints of R sided CP with inspiration, SOB and fever x 3 days.  In the ED, EKG unremarkable, noted to be hypoxic with O2Sat of 89% on RA, was placed on 3 LNC. Sepsis protocol was initiated, , Tmax 101, lactate 3.1, wbc 15. patient received  2L bolus IVF. CXR was positive for RLL  pneumonia and IV Rocephin and Azithromycin were started. Patient was admitted to Medicine service for further care of community acquired bacterial pneumonia with sepsis and acute hypoxic respiratory failure. Repeat lactate normalized, and sepsis had resolved. Blood cultures x 2 negative, patient s/p 5 days of IV antibiotics. Attempted to wean patient off supplemental oxygen, and ambulatory pulse oximetry was performed. At this time patient's  ambulatory oximetry desaturated to 84%. Patient was placed back on 2L NC. Given acute process, ID consulted and recommended switch to IV Rocephin 2g Q24hrs x 5 more days (through 7/11/21), for a total course of 10 days of antibiotics. CTA completed for further evaluation of hypoxia, no evidence of PE, study revealed moderate right pleural effusion causing compressive atelectasis. CT surgery called and placed R sided pigtail chest tube for drainage of pleural effusion, no complications with proper positioning on CXR, 200cc of serous fluid drained initially and sent for cytology/pathology and culture, which showed exudative effusion. Patient found to have transaminitis during stay, abdominal US performed with no acute findings, likely elevated in the setting of sepsis. Patient with new onset hypertension, echocardiogram with normal LVSF grade 1 diastolic dysfunction, started PO Norvasc 5mg QD with good response. Patient feels well with no new complaints and is medically cleared for discharge home.     CONSTITUTIONAL: (-) fever, (-)chills  RESPIRATORY: (-) SOB, (+) cough, (+) chest pain with deep inspiration  CARDIOVASCULAR:  (-) palpitations  GASTROINTESTINAL: (-) abdominal pain, (-) nausea, (-) vomiting , (-) diarrhea   NEUROLOGICAL: (-) headaches,  (-) weakness  MISCELLANEOUS: (-) joint swelling, (-) joint pain    PHYSICAL EXAM:    Vital Signs Last 24 Hrs  T(F): 98.6   HR: 94   BP: 121/77   RR: 18   SpO2: 98%    GENERAL: Patient sitting up comfortably in chair, NAD, A&Ox3.  ENMT: PERRL, +EOMI.  NECK: soft, Supple, No JVD  CHEST/LUNG: Cough with deep inspiration, non-productive. Decreased breath sounds right base. Unlabored respirations at rest on 2L NC, Sa02 97%  HEART: S1S2+, Regular rate and rhythm; No murmurs.  ABDOMEN: Soft, Nontender, Nondistended; Bowel sounds present.  EXT: SIDDIQUI x4, no edema     Length of discharge: 35mins 67 yo F w/ no significant PMHx presented to [facility] ED with complaints of R sided CP with inspiration, SOB and fever x 3 days.  In the ED, EKG unremarkable, noted to be hypoxic with O2Sat of 89% on RA, was placed on 3 LNC. Sepsis protocol was initiated, , Tmax 101, lactate 3.1, wbc 15. patient received  2L bolus IVF. CXR was positive for RLL  pneumonia and IV Rocephin and Azithromycin were started. Patient was admitted to Medicine service for further care of community acquired bacterial pneumonia with sepsis and acute hypoxic respiratory failure. Repeat lactate normalized, and sepsis had resolved. Blood cultures x 2 negative, patient s/p 5 days of IV antibiotics. Attempted to wean patient off supplemental oxygen, and ambulatory pulse oximetry was performed. At this time patient's  ambulatory oximetry desaturated to 84%. Patient was placed back on 2L NC. Given acute process, ID consulted and recommended switch to IV Rocephin 2g Q24hrs x 5 more days (through 7/11/21), for a total course of 10 days of antibiotics. CTA completed for further evaluation of hypoxia, no evidence of PE, study revealed moderate right pleural effusion causing compressive atelectasis. CT surgery called and placed R sided pigtail chest tube for drainage of pleural effusion, no complications with proper positioning on CXR, 200cc of serous fluid drained initially and sent for cytology/pathology and culture, which showed exudative effusion. Patient found to have transaminitis during stay, abdominal US performed with no acute findings, likely elevated in the setting of sepsis. Patient with new onset hypertension, echocardiogram with normal LVSF grade 1 diastolic dysfunction, started PO Norvasc 5mg QD with good response.  Noted to be persistently hypoxic requiring oxygen. CTA chest ruled out PE; positive for moderate right pleural effusion with compressive atelactasiss suspected parapneumonic. Status post chest tube placement by CT surgery. Cultures were negative. ID was consulted, placed on increased dose of IV rocephin.      65 yo F w/ no significant PMHx presented to [facility] ED with complaints of R sided CP with inspiration, SOB and fever x 3 days.  In the ED, EKG unremarkable, noted to be hypoxic with O2Sat of 89% on RA, was placed on 3 LNC. Sepsis protocol was initiated, , Tmax 101, lactate 3.1, wbc 15. patient received  2L bolus IVF. CXR was positive for RLL  pneumonia and IV Rocephin and Azithromycin were started. Patient was admitted to Medicine service for further care of community acquired bacterial pneumonia with sepsis and acute hypoxic respiratory failure. Repeat lactate normalized, and sepsis had resolved. Blood cultures x 2 negative, patient s/p 5 days of IV antibiotics. Attempted to wean patient off supplemental oxygen, and ambulatory pulse oximetry was performed. At this time patient's  ambulatory oximetry desaturated to 84%. Patient was placed back on 2L NC. Given acute process, ID consulted and recommended switch to IV Rocephin 2g Q24hrs x 5 more days (through 7/11/21), for a total course of 10 days of antibiotics. CTA completed for further evaluation of hypoxia, no evidence of PE, study revealed moderate right pleural effusion causing compressive atelectasis. CT surgery called and placed R sided pigtail chest tube for drainage of pleural effusion, no complications with proper positioning on CXR, 200cc of serous fluid drained initially and sent for cytology/pathology and culture, which showed exudative effusion. Patient found to have transaminitis during stay, abdominal US performed with no acute findings, likely elevated in the setting of sepsis. Patient with new onset hypertension, echocardiogram with normal LVSF grade 1 diastolic dysfunction, started PO Norvasc 5mg QD with good response.  Noted to be persistently hypoxic requiring oxygen. CTA chest ruled out PE; positive for moderate right pleural effusion with compressive atelactasiss suspected parapneumonic. Status post chest tube placement by CT surgery. Cultures were negative. ID was consulted, placed on increased dose of IV rocephin.    Chest tube removed with clinical improvement./ Discharged home on 10 days of Po augmentin    56 mins spent coordinating care and discharge

## 2021-07-06 NOTE — CONSULT NOTE ADULT - PROBLEM SELECTOR RECOMMENDATION 9
RT sided pleural effusion in setting of suspected community acquired bacterial pneumonia. Likely a parapneumonic effusion.  RT sided pigtail catheter placed at bedside without complication.  200cc serous fluid drained initially.  Maintain CT to WS.  Daily CXR while CT in place.   Record ouputs qshift even if output is zero.  Fluid sent for cytology/pathology and culture. Pending.  F/u post-procedural CXR    D/w Dr. Garrett

## 2021-07-07 LAB
ALBUMIN FLD-MCNC: 2.6 G/DL — SIGNIFICANT CHANGE UP
CULTURE RESULTS: SIGNIFICANT CHANGE UP
CULTURE RESULTS: SIGNIFICANT CHANGE UP
GLUCOSE FLD-MCNC: 100 MG/DL — SIGNIFICANT CHANGE UP
GRAM STN FLD: SIGNIFICANT CHANGE UP
LDH SERPL L TO P-CCNC: 806 U/L — SIGNIFICANT CHANGE UP
PROT FLD-MCNC: 4.8 G/DL — SIGNIFICANT CHANGE UP
SPECIMEN SOURCE: SIGNIFICANT CHANGE UP

## 2021-07-07 PROCEDURE — 99233 SBSQ HOSP IP/OBS HIGH 50: CPT

## 2021-07-07 PROCEDURE — 99232 SBSQ HOSP IP/OBS MODERATE 35: CPT

## 2021-07-07 PROCEDURE — 71045 X-RAY EXAM CHEST 1 VIEW: CPT | Mod: 26

## 2021-07-07 PROCEDURE — 99232 SBSQ HOSP IP/OBS MODERATE 35: CPT | Mod: 24

## 2021-07-07 RX ADMIN — Medication 100 MILLIGRAM(S): at 23:12

## 2021-07-07 RX ADMIN — Medication 250 MILLIGRAM(S): at 05:24

## 2021-07-07 RX ADMIN — CEFTRIAXONE 100 MILLIGRAM(S): 500 INJECTION, POWDER, FOR SOLUTION INTRAMUSCULAR; INTRAVENOUS at 23:11

## 2021-07-07 RX ADMIN — Medication 650 MILLIGRAM(S): at 15:10

## 2021-07-07 RX ADMIN — AMLODIPINE BESYLATE 5 MILLIGRAM(S): 2.5 TABLET ORAL at 05:24

## 2021-07-07 RX ADMIN — Medication 100 MILLIGRAM(S): at 05:24

## 2021-07-07 RX ADMIN — ENOXAPARIN SODIUM 40 MILLIGRAM(S): 100 INJECTION SUBCUTANEOUS at 13:00

## 2021-07-07 RX ADMIN — CEFTRIAXONE 100 MILLIGRAM(S): 500 INJECTION, POWDER, FOR SOLUTION INTRAMUSCULAR; INTRAVENOUS at 08:22

## 2021-07-07 RX ADMIN — Medication 250 MILLIGRAM(S): at 17:00

## 2021-07-07 RX ADMIN — Medication 650 MILLIGRAM(S): at 13:01

## 2021-07-07 RX ADMIN — Medication 100 MILLIGRAM(S): at 13:01

## 2021-07-07 NOTE — PROGRESS NOTE ADULT - SUBJECTIVE AND OBJECTIVE BOX
CC: Follow up    INTERVAL HPI/OVERNIGHT EVENTS: Patient seen and examined, chest tube placed yesterday. Afebrile. Complaints of pain at chest tube site.       Vital Signs Last 24 Hrs  T(C): 36.3 (07 Jul 2021 11:25), Max: 36.7 (06 Jul 2021 17:46)  T(F): 97.4 (07 Jul 2021 11:25), Max: 98.1 (06 Jul 2021 17:46)  HR: 101 (07 Jul 2021 11:25) (86 - 101)  BP: 141/85 (07 Jul 2021 11:25) (141/85 - 158/117)  BP(mean): --  RR: 18 (07 Jul 2021 11:25) (18 - 18)  SpO2: 97% (07 Jul 2021 11:25) (92% - 97%)    PHYSICAL EXAM:    GENERAL: NAD, AOX3  HEAD:  Atraumatic, Normocephalic  EYES: conjunctiva and sclera clear  ENMT: Moist mucous membranes  NECK: Supple, No JVD  CHEST/LUNG:Decreased breath sounds right lung base right CT in place  HEART: Regular rate and rhythm; No murmurs, rubs, or gallops  ABDOMEN: Soft, Nontender, Nondistended; Bowel sounds present  EXTREMITIES:  2+ Peripheral Pulses, No clubbing, cyanosis, or edema        MEDICATIONS  (STANDING):  amLODIPine   Tablet 5 milliGRAM(s) Oral daily  benzonatate 100 milliGRAM(s) Oral every 8 hours  cefTRIAXone   IVPB 2000 milliGRAM(s) IV Intermittent every 24 hours  enoxaparin Injectable 40 milliGRAM(s) SubCutaneous daily  saccharomyces boulardii 250 milliGRAM(s) Oral two times a day    MEDICATIONS  (PRN):  acetaminophen   Tablet .. 650 milliGRAM(s) Oral every 6 hours PRN Temp greater or equal to 38C (100.4F), Mild Pain (1 - 3), Moderate Pain (4 - 6)  guaiFENesin Oral Liquid (Sugar-Free) 200 milliGRAM(s) Oral every 6 hours PRN Cough  ondansetron Injectable 4 milliGRAM(s) IV Push four times a day PRN Nausea and/or Vomiting  traMADol 50 milliGRAM(s) Oral three times a day PRN Severe Pain (7 - 10)      Allergies    No Known Allergies    Intolerances          LABS:                          11.7   10.64 )-----------( 534      ( 06 Jul 2021 08:32 )             35.6     07-06    134<L>  |  96<L>  |  5.2<L>  ----------------------------<  98  3.7   |  26.0  |  0.47<L>    Ca    9.0      06 Jul 2021 08:32    TPro  6.6  /  Alb  2.7<L>  /  TBili  0.6  /  DBili  x   /  AST  83<H>  /  ALT  89<H>  /  AlkPhos  229<H>  07-06          RADIOLOGY & ADDITIONAL TESTS:

## 2021-07-07 NOTE — PROGRESS NOTE ADULT - SUBJECTIVE AND OBJECTIVE BOX
[facility]  INFECTIOUS DISEASES AND INTERNAL MEDICINE at [place]  =======================================================  Christopher Fernandez MD  Diplomates American Board of Internal Medicine and Infectious Diseases  Tel  [phone]  Fax [phone]  =======================================================    [facility]-[id number]  SELINA RODRIGUEZ  follow up:  right pleural effusion    chest tube still in place.   cx in process  breathing better.       I have personally reviewed the labs and data; pertinent labs and data are listed in this note; please see below.   =======================================================  Past Medical & Surgical Hx:  =====================  PAST MEDICAL & SURGICAL HISTORY:  No pertinent past medical history  No significant past surgical history      Problem List:  ==========  HEALTH ISSUES - PROBLEM Dx:    Social Hx:  =======  no toxic habits currently    FAMILY HISTORY:  FH: hypertension - mother  no significant family history of immunosuppressive disorders in mother or father   =======================================================    REVIEW OF SYSTEMS:  CONSTITUTIONAL:  No Fever or chills  HEENT:  No diplopia or blurred vision.  No earache, sore throat or runny nose.  CARDIOVASCULAR:  No pressure, squeezing, strangling, tightness, heaviness or aching about the chest, neck, axilla or epigastrium.  RESPIRATORY:  POSITIVE cough, shortness of breath  GASTROINTESTINAL:  No nausea, vomiting or diarrhea.  GENITOURINARY:  No dysuria, frequency or urgency. No Blood in urine  MUSCULOSKELETAL:  no joint aches, no muscle pain  SKIN:  No change in skin, hair or nails.  NEUROLOGIC:  No Headaches, seizures or weakness.  PSYCHIATRIC:  No disorder of thought or mood.  ENDOCRINE:  No heat or cold intolerance  HEMATOLOGICAL:  No easy bruising or bleeding.    =======================================================  Allergies  No Known Allergies    ======================================================  Physical Exam:  ============     General:  No acute distress.  THIN  Eye: Pupils are equal, round and reactive to light, Extraocular movements are intact, Normal conjunctiva.  HENT: Normocephalic, Oral mucosa is moist, No pharyngeal erythema, No sinus tenderness.  Neck: Supple, No lymphadenopathy.  Respiratory: Lungs with crackles at RIGHT mid zone.  poor aeration at right base.   LEFT lung zone with good air entry  RIGHT sided chest tube in place; to [place]  Cardiovascular: Normal rate, Regular rhythm,   Gastrointestinal: Soft, Non-tender, Non-distended, Normal bowel sounds.  Genitourinary: No costovertebral angle tenderness.  Lymphatics: No lymphadenopathy neck,   Musculoskeletal: Normal range of motion, Normal strength.  Integumentary: No rash.  Neurologic: Alert, Oriented, No focal deficits, Cranial Nerves II-XII are grossly intact.  Psychiatric: Appropriate mood & affect.    =======================================================  Vitals:  ============  T(F): 97.4 (07 Jul 2021 11:25), Max: 98.1 (06 Jul 2021 17:46)  HR: 101 (07 Jul 2021 11:25)  BP: 141/85 (07 Jul 2021 11:25)  RR: 18 (07 Jul 2021 11:25)  SpO2: 97% (07 Jul 2021 11:25) (92% - 97%)  temp max in last 48H T(F): , Max: 98.6 (07-06-21 @ 04:35)    =======================================================  Current Antibiotics:  cefTRIAXone   IVPB 2000 milliGRAM(s) IV Intermittent every 24 hours    Other medications:  amLODIPine   Tablet 5 milliGRAM(s) Oral daily  benzonatate 100 milliGRAM(s) Oral every 8 hours  enoxaparin Injectable 40 milliGRAM(s) SubCutaneous daily  saccharomyces boulardii 250 milliGRAM(s) Oral two times a day    =======================================================  Labs:                        11.7   10.64 )-----------( 534      ( 06 Jul 2021 08:32 )             35.6     07-06    134<L>  |  96<L>  |  5.2<L>  ----------------------------<  98  3.7   |  26.0  |  0.47<L>    Ca    9.0      06 Jul 2021 08:32    TPro  6.6  /  Alb  2.7<L>  /  TBili  0.6  /  DBili  x   /  AST  83<H>  /  ALT  89<H>  /  AlkPhos  229<H>  07-06      Culture - Fungal, Body Fluid (collected 07-06-21 @ 22:03)  Source: .Body Fluid Pleural Fluid    Culture - Body Fluid with Gram Stain (collected 07-06-21 @ 22:03)  Source: .Body Fluid Pleural Fluid  Gram Stain (07-07-21 @ 01:28):    polymorphonuclear leukocytes seen    No organisms seen    by cytocentrifuge    Culture - Urine (collected 07-03-21 @ 01:24)  Source: .Urine Clean Catch (Midstream)  Final Report (07-03-21 @ 22:26):    <10,000 CFU/mL Normal Urogenital Toya    Culture - Blood (collected 07-02-21 @ 08:06)  Source: .Blood Blood-Peripheral  Final Report (07-07-21 @ 09:00):    No growth at 5 days.    Culture - Blood (collected 07-02-21 @ 08:05)  Source: .Blood Blood-Peripheral  Final Report (07-07-21 @ 09:00):    No growth at 5 days.     SARS-CoV-2: NotDetec (07-02-21 @ 08:16)  Rapid RVP Result: NotDetec (07-02-21 @ 08:16)      < from: CT Angio Chest PE Protocol w/ IV Cont (07.06.21 @ 12:45) >     EXAM:  CT ANGIO CHEST PULM ART [facility]                          PROCEDURE DATE:  07/06/2021          INTERPRETATION:  CLINICAL INFORMATION: Hypoxia    COMPARISON: 7/3/2021    CONTRAST/COMPLICATIONS:  IV Contrast: Omnipaque 350  96 cc administered   4 cc discarded  Oral Contrast: NONE  Complications: None reported at time of study completion    PROCEDURE:  CT Angiography of the Chest.  Sagittal and coronal reformats were performed as well as 3D (MIP) reconstructions.    FINDINGS:    LUNGS AND AIRWAYS: Patent central airways.  Near complete compressive atelectasis of the right lower lobe. Partial atelectasis of the posterior right middle lobe and posterior right upper lobe. Left basilar compressive atelectasis. Persistent nodular density in the left upper lobe may represent pneumonia however short-term follow-up to resolution is recommended to exclude underlying malignancy.  PLEURA: Moderate right pleural effusion. Trace left pleural effusion.  MEDIASTINUM AND NUZHAT: No lymphadenopathy.  VESSELS: No definite evidence of acute pulmonary embolism. No aortic aneurysm.  HEART: Heart is mildly prominent. No pericardial effusion.  CHEST WALL AND LOWER NECK: Within normal limits.  VISUALIZED UPPER ABDOMEN: Mild hepatomegaly. Mild elevation of the right hemidiaphragm.  Contracted gallbladder.  BONES: Degenerative changes.    IMPRESSION:  No definite evidence of acute pulmonary embolism  Moderate right pleural effusion with near complete compressive atelectasis of the right lower lobe. Partial atelectasis of the posterior right middle lobe and posterior right upper lobe. Trace left pleural effusion with left basilar compressive atelectasis  Persistent nodular density in the left upper lobe may represent pneumonia however short-term follow-up to resolution is recommended to exclude underlying malignancy.    --- End of Report ---      MIGUEL SHELTON MD; Attending Radiologist  This document has been electronically signed. Jul 6 2021  1:09PM    < end of copied text >

## 2021-07-07 NOTE — PROGRESS NOTE ADULT - ASSESSMENT
66 year old female with PMH, no MD f/u presents to ER for 2-3 days of fevers, chills and right sided chest pain, worse on inspiration. minimal cough.  took tylenol at home w/o improvement.   In ER, febrile, tachy with tachypnea, leukocytosis and RLL infiltrate on CXR. Treated for suspected pneumonia. CT scan with RT sided pleural effusion and compressive atelectasis, suspicious for parapneumonic effusion. CT surgery consulted for drainage of RT sided pleural effusion.

## 2021-07-07 NOTE — PROGRESS NOTE ADULT - SUBJECTIVE AND OBJECTIVE BOX
Subjective: "I am better today, yesterday was tough when they inserted the chest tube."  Pt. OOB to chair, reports discomfort at right chest tube sight.    VITAL SIGNS  Vital Signs Last 24 Hrs  T(C): 36.3 (07-07-21 @ 11:25), Max: 36.7 (07-06-21 @ 17:46)  T(F): 97.4 (07-07-21 @ 11:25), Max: 98.1 (07-06-21 @ 17:46)  HR: 101 (07-07-21 @ 11:25) (86 - 101)  BP: 141/85 (07-07-21 @ 11:25) (141/85 - 158/117)  RR: 18 (07-07-21 @ 11:25) (18 - 18)  SpO2: 97% (07-07-21 @ 11:25) (92% - 97%)  on (O2)              MEDICATIONS  acetaminophen   Tablet .. 650 milliGRAM(s) Oral every 6 hours PRN  amLODIPine   Tablet 5 milliGRAM(s) Oral daily  benzonatate 100 milliGRAM(s) Oral every 8 hours  cefTRIAXone   IVPB 2000 milliGRAM(s) IV Intermittent every 24 hours  enoxaparin Injectable 40 milliGRAM(s) SubCutaneous daily  guaiFENesin Oral Liquid (Sugar-Free) 200 milliGRAM(s) Oral every 6 hours PRN  ondansetron Injectable 4 milliGRAM(s) IV Push four times a day PRN  saccharomyces boulardii 250 milliGRAM(s) Oral two times a day  traMADol 50 milliGRAM(s) Oral three times a day PRN      PHYSICAL EXAM  General: no acute distress  Neurology: alert and oriented x 3, nonfocal, no gross deficits  Respiratory: Diminished at the right base   CV: regular rate and rhythm, normal S1, S2  Abdomen: soft, nontender, nondistended, positive bowel sounds  Extremities: warm, well perfused. no edema. + DP pulses  Chest tubes: right pigtail to water seal, no air leak detected.      I&O's Detail    06 Jul 2021 07:01  -  07 Jul 2021 07:00  --------------------------------------------------------  IN:  Total IN: 0 mL    OUT:    Chest Tube (mL): 230 mL  Total OUT: 230 mL    Total NET: -230 mL      07 Jul 2021 07:01  -  07 Jul 2021 12:16  --------------------------------------------------------  IN:  Total IN: 0 mL    OUT:    Chest Tube (mL): 10 mL  Total OUT: 10 mL    Total NET: -10 mL          Weights:  Daily     Daily   Admit Wt: Drug Dosing Weight  Height (cm): 162.6 (02 Jul 2021 07:28)  Weight (kg): 67.6 (02 Jul 2021 07:37)  BMI (kg/m2): 25.6 (02 Jul 2021 07:37)  BSA (m2): 1.73 (02 Jul 2021 07:37)    LABS  07-06    134<L>  |  96<L>  |  5.2<L>  ----------------------------<  98  3.7   |  26.0  |  0.47<L>    Ca    9.0      06 Jul 2021 08:32    TPro  6.6  /  Alb  2.7<L>  /  TBili  0.6  /  DBili  x   /  AST  83<H>  /  ALT  89<H>  /  AlkPhos  229<H>  07-06                                 11.7   10.64 )-----------( 534      ( 06 Jul 2021 08:32 )             35.6                  CAPILLARY BLOOD GLUCOSE               CXR:  < from: Xray Chest 1 View- PORTABLE-Urgent (Xray Chest 1 View- PORTABLE-Urgent .) (07.06.21 @ 15:53) >  EXAM:  XR CHEST PORTABLE URGENT 1V                          PROCEDURE DATE:  07/06/2021          INTERPRETATION:  Clinical history: 66-year-old female, status post pigtail insertion.    Two expiratory/kyphotic views are compared correlated with a concurrent chest CT and demonstrate a normal cardiac silhouette and normal pulmonary vasculature with improvement in the right pleural effusion..    A small right apical pneumothorax is evident.    Mild platelike atelectasis at the left base, unchanged.    IMPRESSION:  Right chest tube in place with improvement in pleural effusion. Small right apical pneumothorax. Findings discussed with Dr. Matta at 1605 on 7/6/2021    --- End of Report ---      JANAY RAMIREZ DO; Attending Radiologist  This document has been electronically signed. Jul 6 2021  4:07PM    < end of copied text >        PAST MEDICAL & SURGICAL HISTORY:  No pertinent past medical history    No significant past surgical history

## 2021-07-07 NOTE — PROGRESS NOTE ADULT - ASSESSMENT
The patient is a 66 year old female with no significant past medical history who presented to the Er with complaints of fever, chills and right sided chest pain. Admitted for sepsis secondary to right lower lobe pneumonia.     Assessment/Plan:    1. Sepsis secondary to right lower lobe community acquired pneumonia with acute hypoxic respiratory failure on 2 liters:  Complicated by moderate right pleural effusion likely parapneumonic causing compressive atelectasis  Status post chest tube placement by CT surgery on 7/6    Completed 5 days of IV Rocephin  Completed 5 days of IV Rocephin 1 gm Q24. increased to 2gm by ID on 7/6  Awaiting pleural fluid culture results  Incentive spirometry   Encourage ambulation out of bed    2, Hyponatremia with metabolic acidosis  Improved with IV hydration   Monitor bMP    3. Transaminitis Elevated LFTS  with right UQ pain: Abdominal ultrasound was normal  Trend LFTS    4. Hypophosphatemia Supplement Phos   Follow up Phos in am     5. Hypertension: Has not seen a physician in >30 years  Echocardiogram with normal LVSF grade 1 diastolic dysfunction  BP improved wtih  Norvasc 5 mg PO OD  Monitor BP        VTE Lovenox subcut

## 2021-07-07 NOTE — PROGRESS NOTE ADULT - ASSESSMENT
This 67 yo F w/ no PMHx, no MD f/u presents to ER for 2-3 days of fevers, chills and right sided chest pain, worse on inspiration. minimal cough.  took tylenol at home w/o improvement. denies GI complaints.  in ER, febrile, tachy with tachypnea, leukocytosis and RLL infiltrate on CXR. (02 Jul 2021 12:21)    She was admitted on 7/2/21 for sepsis secondary to right lower lobe community acquired pneumonia with acute hypoxic respiratory failure on 2 liters.  She had a CTA chest which ruled out PE.  A large right sided effusion was noted and - CT surgery called.  IN total, she had received  IV Rocephin and azithromycin day x 5.  She still has a non-productive cough.   ID consult for recommendations.    patient denies travel.   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    Immediately prior to ID consultation, CT surgery team had placed a right sided chest tube.  light yellow fluid from drainage.     Impression:  Right lobar pnuemonia  Right effussion  right lung atelectasis  cough  WBC elevation      Plan:  Patient had received 5 days of coverage for CAP  cough likely from atelectasis due to (likely parapneumonic) pleural effusion    - now s/p right chest tube on 7/6/21  fluid gram stain negative so far  - follow up all outstanding cultures    WBC elevation is reactive; now normalized  - will follow and trend    CONTINUE  Ceftriaxone 2 grams Q 24H x 5 more days.   THRU and including 7/11/21      - trend temperature   - repeat cultures from blood and all sources if febrile.    mgmt of chest tube per CTS team

## 2021-07-08 LAB
ANION GAP SERPL CALC-SCNC: 12 MMOL/L — SIGNIFICANT CHANGE UP (ref 5–17)
BUN SERPL-MCNC: 9 MG/DL — SIGNIFICANT CHANGE UP (ref 8–20)
CALCIUM SERPL-MCNC: 9.4 MG/DL — SIGNIFICANT CHANGE UP (ref 8.6–10.2)
CHLORIDE SERPL-SCNC: 97 MMOL/L — LOW (ref 98–107)
CO2 SERPL-SCNC: 25 MMOL/L — SIGNIFICANT CHANGE UP (ref 22–29)
CREAT SERPL-MCNC: 0.53 MG/DL — SIGNIFICANT CHANGE UP (ref 0.5–1.3)
GLUCOSE SERPL-MCNC: 103 MG/DL — HIGH (ref 70–99)
HCT VFR BLD CALC: 38.6 % — SIGNIFICANT CHANGE UP (ref 34.5–45)
HGB BLD-MCNC: 12.5 G/DL — SIGNIFICANT CHANGE UP (ref 11.5–15.5)
MCHC RBC-ENTMCNC: 30.8 PG — SIGNIFICANT CHANGE UP (ref 27–34)
MCHC RBC-ENTMCNC: 32.4 GM/DL — SIGNIFICANT CHANGE UP (ref 32–36)
MCV RBC AUTO: 95.1 FL — SIGNIFICANT CHANGE UP (ref 80–100)
PHOSPHATE SERPL-MCNC: 3.2 MG/DL — SIGNIFICANT CHANGE UP (ref 2.4–4.7)
PLATELET # BLD AUTO: 670 K/UL — HIGH (ref 150–400)
POTASSIUM SERPL-MCNC: 4 MMOL/L — SIGNIFICANT CHANGE UP (ref 3.5–5.3)
POTASSIUM SERPL-SCNC: 4 MMOL/L — SIGNIFICANT CHANGE UP (ref 3.5–5.3)
RBC # BLD: 4.06 M/UL — SIGNIFICANT CHANGE UP (ref 3.8–5.2)
RBC # FLD: 13.2 % — SIGNIFICANT CHANGE UP (ref 10.3–14.5)
SODIUM SERPL-SCNC: 134 MMOL/L — LOW (ref 135–145)
WBC # BLD: 10.88 K/UL — HIGH (ref 3.8–10.5)
WBC # FLD AUTO: 10.88 K/UL — HIGH (ref 3.8–10.5)

## 2021-07-08 PROCEDURE — 99233 SBSQ HOSP IP/OBS HIGH 50: CPT

## 2021-07-08 PROCEDURE — 71045 X-RAY EXAM CHEST 1 VIEW: CPT | Mod: 26,76

## 2021-07-08 PROCEDURE — 99231 SBSQ HOSP IP/OBS SF/LOW 25: CPT

## 2021-07-08 PROCEDURE — 99232 SBSQ HOSP IP/OBS MODERATE 35: CPT

## 2021-07-08 RX ADMIN — Medication 250 MILLIGRAM(S): at 17:37

## 2021-07-08 RX ADMIN — Medication 100 MILLIGRAM(S): at 14:03

## 2021-07-08 RX ADMIN — ENOXAPARIN SODIUM 40 MILLIGRAM(S): 100 INJECTION SUBCUTANEOUS at 14:03

## 2021-07-08 RX ADMIN — Medication 100 MILLIGRAM(S): at 07:45

## 2021-07-08 RX ADMIN — AMLODIPINE BESYLATE 5 MILLIGRAM(S): 2.5 TABLET ORAL at 07:45

## 2021-07-08 RX ADMIN — Medication 250 MILLIGRAM(S): at 07:45

## 2021-07-08 NOTE — PROGRESS NOTE ADULT - ASSESSMENT
The patient is a 66 year old female with no significant past medical history who presented to the Er with complaints of fever, chills and right sided chest pain. Admitted for sepsis secondary to right lower lobe pneumonia.     Assessment/Plan:    1. Sepsis secondary to right lower lobe community acquired pneumonia with acute hypoxic respiratory failure on 2 liters: Improved  Complicated by moderate right pleural effusion likely parapneumonic causing compressive atelectasis  Status post chest tube placement by CT surgery on 7/6  Hypoxia resolved; SPo2 of 94% on room air at rest  Minimal drainage via CT overnight, spoke with CT surgery will reassess for potential removal  Pleural fluid cultures negative thus far   Per ID will continue IV Rocephin 2 gm Q24 hours until 7/11 if discharged will send home on PO  Augmentin BID for 14 days  Complicated by moderate right pleural effusion likely parapneumonic causing compressive atelectasis  Status post chest tube placement by CT surgery on 7/6    Completed 5 days of IV Rocephin1 gm   Incentive spirometry   Encourage ambulation out of bed    2, Hyponatremia with metabolic acidosis  Improved with IV hydration   Monitor bMP    3. Transaminitis Elevated LFTS  with right UQ pain: Abdominal ultrasound was normal  Trend LFTS    4. Hypophosphatemia Supplement Phos   Follow up Phos in am     5. Hypertension: Has not seen a physician in >30 years  Echocardiogram with normal LVSF grade 1 diastolic dysfunction  BP improved wtih  Norvasc 5 mg PO OD  Monitor BP        VTE Lovenox subcut     Discharge home 24 hours after chest tube removal if clinically stable

## 2021-07-08 NOTE — PROGRESS NOTE ADULT - SUBJECTIVE AND OBJECTIVE BOX
Brief Hospital Course:     Significant recent/past 24 hr events:    Subjective:    Review of Systems         Unable to obtain due to: intubated & sedated altered mental status _______________    Constitutional: denies fever, chills, general malaise, fatigue, weight loss, weight gain, diaphoresis   HEENT: denies dry mouth, sore throat, runny nose, photophobia, blurry vision, double vision, discharge, eye pain, difficulty hearing, vertigo, dysphagia, epistaxis, recent dental work    Neck: denies pain or stiffness  Breasts: denies pain, masses, or nipple discharge  Respiratory: denies SOB, ADKINS, cough, phlegm, wheezing, hemoptysis  Cardiovascular: denies CP, palpitations, edema, diaphoresis   Gastrointestinal: denies nausea, vomiting or hematemesis, diarrhea, constipation, abdominal or epigastric pain, melena or hematochezia   Genitourinary: denies dysuria, frequency, urgency, incontinence, hematuria   Skin: denies rash, hives, itching, burning, masses  Musculoskeletal: denies myalgias, arthritis, joint swelling, muscle weakness  Neurologic: denies syncope, LOC, headache, weakness, dizziness, parasthesias, numbness, seizures, confusion, dementia   Psychiatric: denies feeling anxious, depressed, suicidal, homicidal  Endocrine: denies increased fingerstick glucoses, cold or heat intolerance, polydipsia, polyuria, polyphagia, hair loss  Hematology/Oncology: denies bruising, tender or enlarged lymph nodes   Allergy/immunologic: denies hives or eczema  ROS negative x 10 systems except as noted above      Patient is a 66y old  Female who presents with a chief complaint of back/chest pain (08 Jul 2021 10:17)    HPI:  65 yo F w/ no PMHx, no MD f/u presents to ER for 2-3 days of fevers, chills and right sided chest pain, worse on inspiration. minimal cough.  took tylenol at home w/o improvement. denies GI complaints.  in ER, febrile, tachy with tachypnea, leukocytosis and RLL infiltrate on CXR. (02 Jul 2021 12:21)    PAST MEDICAL & SURGICAL HISTORY:  No pertinent past medical history    No significant past surgical history      FAMILY HISTORY:  FH: hypertension        Vitals   ICU Vital Signs Last 24 Hrs  T(C): 37.2 (08 Jul 2021 17:42), Max: 37.2 (08 Jul 2021 17:42)  T(F): 98.9 (08 Jul 2021 17:42), Max: 98.9 (08 Jul 2021 17:42)  HR: 104 (08 Jul 2021 17:42) (82 - 111)  BP: 139/84 (08 Jul 2021 17:42) (129/84 - 155/94)    RR: 20 (08 Jul 2021 17:42) (18 - 20)  SpO2: 93% (08 Jul 2021 17:42) (93% - 95%)    I&O's Detail    07 Jul 2021 07:01  -  08 Jul 2021 07:00  --------------------------------------------------------  IN:  Total IN: 0 mL    OUT:    Chest Tube (mL): 20 mL  Total OUT: 20 mL  Total NET: -20 mL    LABS                        12.5   10.88 )-----------( 670      ( 08 Jul 2021 08:47 )             38.6     07-08    134<L>  |  97<L>  |  9.0  ----------------------------<  103<H>  4.0   |  25.0  |  0.53    Ca    9.4      08 Jul 2021 08:47  Phos  3.2     07-08    MEDICATIONS  (STANDING):  amLODIPine   Tablet 5 milliGRAM(s) Oral daily  benzonatate 100 milliGRAM(s) Oral every 8 hours  cefTRIAXone   IVPB 2000 milliGRAM(s) IV Intermittent every 24 hours  enoxaparin Injectable 40 milliGRAM(s) SubCutaneous daily  saccharomyces boulardii 250 milliGRAM(s) Oral two times a day    MEDICATIONS  (PRN):  acetaminophen   Tablet .. 650 milliGRAM(s) Oral every 6 hours PRN Temp greater or equal to 38C (100.4F), Mild Pain (1 - 3), Moderate Pain (4 - 6)  guaiFENesin Oral Liquid (Sugar-Free) 200 milliGRAM(s) Oral every 6 hours PRN Cough  ondansetron Injectable 4 milliGRAM(s) IV Push four times a day PRN Nausea and/or Vomiting  traMADol 50 milliGRAM(s) Oral three times a day PRN Severe Pain (7 - 10)    Allergies:  No Known Allergies    Physical Exam:  Gen: NAD, well-groomed, well-developed  Neuro: A&Ox3, non-focal, speech clear and intact  HEENT: NC/AT, PERRL, EOMI, anicteric sclerae, dave mucose pink and moist  Neck: supple, no JVD  CV: S1S2 RRR, no murmurs/rubs  Pulm: CTA b/l, no wheezing/rales/rhonchi, no use of accessory muscles  Abd:+ BS soft NT ND  Ext: SIDDIQUI, no clubbing/cyanosis/edema  Vascular:2+ DP/radial pulses b/l  Skin: warm, well perfused, no diaphoresis or lesions  psych: calm, appropriate affect  inc:  lines/tubes:       Subjective: c/o pain at chest tube insertion site    Review of Systems: as above         Patient is a 66y old  Female who presents with a chief complaint of back/chest pain (08 Jul 2021 10:17)    HPI:  65 yo F w/ no PMHx, no MD f/u presents to ER for 2-3 days of fevers, chills and right sided chest pain, worse on inspiration. minimal cough.  took tylenol at home w/o improvement. denies GI complaints.  in ER, febrile, tachy with tachypnea, leukocytosis and RLL infiltrate on CXR. (02 Jul 2021 12:21)    PAST MEDICAL & SURGICAL HISTORY:  No pertinent past medical history    No significant past surgical history      FAMILY HISTORY:  FH: hypertension        Vitals   ICU Vital Signs Last 24 Hrs  T(C): 37.2 (08 Jul 2021 17:42), Max: 37.2 (08 Jul 2021 17:42)  T(F): 98.9 (08 Jul 2021 17:42), Max: 98.9 (08 Jul 2021 17:42)  HR: 104 (08 Jul 2021 17:42) (82 - 111)  BP: 139/84 (08 Jul 2021 17:42) (129/84 - 155/94)    RR: 20 (08 Jul 2021 17:42) (18 - 20)  SpO2: 93% (08 Jul 2021 17:42) (93% - 95%)    I&O's Detail    07 Jul 2021 07:01  -  08 Jul 2021 07:00  --------------------------------------------------------  IN:  Total IN: 0 mL    OUT:    Chest Tube (mL): 20 mL  Total OUT: 20 mL  Total NET: -20 mL    LABS                        12.5   10.88 )-----------( 670      ( 08 Jul 2021 08:47 )             38.6     07-08    134<L>  |  97<L>  |  9.0  ----------------------------<  103<H>  4.0   |  25.0  |  0.53    Ca    9.4      08 Jul 2021 08:47  Phos  3.2     07-08    MEDICATIONS  (STANDING):  amLODIPine   Tablet 5 milliGRAM(s) Oral daily  benzonatate 100 milliGRAM(s) Oral every 8 hours  cefTRIAXone   IVPB 2000 milliGRAM(s) IV Intermittent every 24 hours  enoxaparin Injectable 40 milliGRAM(s) SubCutaneous daily  saccharomyces boulardii 250 milliGRAM(s) Oral two times a day    MEDICATIONS  (PRN):  acetaminophen   Tablet .. 650 milliGRAM(s) Oral every 6 hours PRN Temp greater or equal to 38C (100.4F), Mild Pain (1 - 3), Moderate Pain (4 - 6)  guaiFENesin Oral Liquid (Sugar-Free) 200 milliGRAM(s) Oral every 6 hours PRN Cough  ondansetron Injectable 4 milliGRAM(s) IV Push four times a day PRN Nausea and/or Vomiting  traMADol 50 milliGRAM(s) Oral three times a day PRN Severe Pain (7 - 10)    Allergies:  No Known Allergies    Physical Exam:  Gen: NAD, well-groomed, well-developed  Neuro: A&Ox3, non-focal, speech clear and intact  HEENT: NC/AT, PERRL, EOMI, anicteric sclerae, dave mucose pink and moist  Neck: supple, no JVD  CV: S1S2 RRR, no murmurs/rubs  Pulm: CTA b/l, no wheezing/rales/rhonchi, no use of accessory muscles  Abd:+ BS soft NT ND  Ext: SIDDIQUI, no clubbing/cyanosis/edema  Vascular:2+ DP/radial pulses b/l  Skin: warm, well perfused, no diaphoresis or lesions  psych: calm, appropriate affect  inc:  lines/tubes:       Subjective: c/o pain at chest tube insertion site, otherwise reports breathing feels better, denies CP, palpitations, SOB, cough, fever, chills, itchiness/rash, diaphoresis, vision changes, HA, dizziness/lightheadedness, numbness/tingling, abd pain, N/V     Review of Systems: as above         Patient is a 66y old  Female who presents with a chief complaint of back/chest pain (08 Jul 2021 10:17)    HPI:  67 yo F w/ no PMHx, no MD f/u presents to ER for 2-3 days of fevers, chills and right sided chest pain, worse on inspiration. minimal cough.  took tylenol at home w/o improvement. denies GI complaints.  in ER, febrile, tachy with tachypnea, leukocytosis and RLL infiltrate on CXR. (02 Jul 2021 12:21)    PAST MEDICAL & SURGICAL HISTORY:  No pertinent past medical history  No significant past surgical history    FAMILY HISTORY:  FH: hypertension    Vitals   ICU Vital Signs Last 24 Hrs  T(C): 37.2 (08 Jul 2021 17:42), Max: 37.2 (08 Jul 2021 17:42)  T(F): 98.9 (08 Jul 2021 17:42), Max: 98.9 (08 Jul 2021 17:42)  HR: 104 (08 Jul 2021 17:42) (82 - 111)  BP: 139/84 (08 Jul 2021 17:42) (129/84 - 155/94)    RR: 20 (08 Jul 2021 17:42) (18 - 20)  SpO2: 93% (08 Jul 2021 17:42) (93% - 95%)    I&O's Detail    07 Jul 2021 07:01  -  08 Jul 2021 07:00  --------------------------------------------------------  IN:  Total IN: 0 mL    OUT:    Chest Tube (mL): 20 mL  Total OUT: 20 mL  Total NET: -20 mL    LABS                        12.5   10.88 )-----------( 670      ( 08 Jul 2021 08:47 )             38.6     07-08    134<L>  |  97<L>  |  9.0  ----------------------------<  103<H>  4.0   |  25.0  |  0.53    Ca    9.4      08 Jul 2021 08:47  Phos  3.2     07-08    MEDICATIONS  (STANDING):  amLODIPine   Tablet 5 milliGRAM(s) Oral daily  benzonatate 100 milliGRAM(s) Oral every 8 hours  cefTRIAXone   IVPB 2000 milliGRAM(s) IV Intermittent every 24 hours  enoxaparin Injectable 40 milliGRAM(s) SubCutaneous daily  saccharomyces boulardii 250 milliGRAM(s) Oral two times a day    MEDICATIONS  (PRN):  acetaminophen   Tablet .. 650 milliGRAM(s) Oral every 6 hours PRN Temp greater or equal to 38C (100.4F), Mild Pain (1 - 3), Moderate Pain (4 - 6)  guaiFENesin Oral Liquid (Sugar-Free) 200 milliGRAM(s) Oral every 6 hours PRN Cough  ondansetron Injectable 4 milliGRAM(s) IV Push four times a day PRN Nausea and/or Vomiting  traMADol 50 milliGRAM(s) Oral three times a day PRN Severe Pain (7 - 10)    Allergies:  No Known Allergies    Physical Exam:  Gen: NAD  Neuro: A&Ox3, non-focal, speech clear and intact  CV: S1S2 RRR,  Pulm: decreased breath sounds at R base  Abd:+ BS soft NT ND  Ext: SIDDIQUI, no cyanosis or edema  R pigtail to waterseal no air leak

## 2021-07-08 NOTE — PROGRESS NOTE ADULT - SUBJECTIVE AND OBJECTIVE BOX
[facility]  INFECTIOUS DISEASES AND INTERNAL MEDICINE at [place]  =======================================================  Christopher Fernandez MD  Diplomates American Board of Internal Medicine and Infectious Diseases  Tel  [phone]  Fax [phone]  =======================================================    [facility]-[id number]  SELINA RODRIGUEZ  follow up:  right pleural effusion    chest tube still in place.   no new issues  cultures remain negative      I have personally reviewed the labs and data; pertinent labs and data are listed in this note; please see below.   =======================================================  Past Medical & Surgical Hx:  =====================  PAST MEDICAL & SURGICAL HISTORY:  No pertinent past medical history  No significant past surgical history      Problem List:  ==========  HEALTH ISSUES - PROBLEM Dx:    Social Hx:  =======  no toxic habits currently    FAMILY HISTORY:  FH: hypertension - mother  no significant family history of immunosuppressive disorders in mother or father   =======================================================    REVIEW OF SYSTEMS:  CONSTITUTIONAL:  No Fever or chills  HEENT:  No diplopia or blurred vision.  No earache, sore throat or runny nose.  CARDIOVASCULAR:  No pressure, squeezing, strangling, tightness, heaviness or aching about the chest, neck, axilla or epigastrium.  RESPIRATORY:  POSITIVE cough, shortness of breath  GASTROINTESTINAL:  No nausea, vomiting or diarrhea.  GENITOURINARY:  No dysuria, frequency or urgency. No Blood in urine  MUSCULOSKELETAL:  no joint aches, no muscle pain  SKIN:  No change in skin, hair or nails.  NEUROLOGIC:  No Headaches, seizures or weakness.  PSYCHIATRIC:  No disorder of thought or mood.  ENDOCRINE:  No heat or cold intolerance  HEMATOLOGICAL:  No easy bruising or bleeding.    =======================================================  Allergies  No Known Allergies    ======================================================  Physical Exam:  ============     General:  No acute distress.  THIN  Eye: Pupils are equal, round and reactive to light, Extraocular movements are intact, Normal conjunctiva.  HENT: Normocephalic, Oral mucosa is moist, No pharyngeal erythema, No sinus tenderness.  Neck: Supple, No lymphadenopathy.  Respiratory: Lungs with crackles at RIGHT mid zone.  poor aeration at right base. no significant change  LEFT lung zone with good air entry  RIGHT sided chest tube in place; to Water Seal  Cardiovascular: Normal rate, Regular rhythm,   Gastrointestinal: Soft, Non-tender, Non-distended, Normal bowel sounds.  Genitourinary: No costovertebral angle tenderness.  Lymphatics: No lymphadenopathy neck,   Musculoskeletal: Normal range of motion, Normal strength.  Integumentary: No rash.  Neurologic: Alert, Oriented, No focal deficits, Cranial Nerves II-XII are grossly intact.  Psychiatric: Appropriate mood & affect.    =======================================================  Vitals:  ============  T(F): 98 (08 Jul 2021 04:47), Max: 98.1 (07 Jul 2021 16:57)  HR: 82 (08 Jul 2021 04:47)  BP: 155/94 (08 Jul 2021 04:47)  RR: 18 (08 Jul 2021 04:47)  SpO2: 94% (08 Jul 2021 04:47) (94% - 97%)  temp max in last 48H T(F): , Max: 98.3 (07-06-21 @ 12:06)    =======================================================  Current Antibiotics:  cefTRIAXone   IVPB 2000 milliGRAM(s) IV Intermittent every 24 hours    Other medications:  amLODIPine   Tablet 5 milliGRAM(s) Oral daily  benzonatate 100 milliGRAM(s) Oral every 8 hours  enoxaparin Injectable 40 milliGRAM(s) SubCutaneous daily  saccharomyces boulardii 250 milliGRAM(s) Oral two times a day    =======================================================  Labs:                        12.5   10.88 )-----------( 670      ( 08 Jul 2021 08:47 )             38.6     07-08    134<L>  |  97<L>  |  9.0  ----------------------------<  103<H>  4.0   |  25.0  |  0.53    Ca    9.4      08 Jul 2021 08:47  Phos  3.2     07-08      Culture - Fungal, Body Fluid (collected 07-06-21 @ 22:03)  Source: .Body Fluid Pleural Fluid    Culture - Body Fluid with Gram Stain (collected 07-06-21 @ 22:03)  Source: .Body Fluid Pleural Fluid  Gram Stain (07-07-21 @ 01:28):    polymorphonuclear leukocytes seen    No organisms seen    by cytocentrifuge    Culture - Urine (collected 07-03-21 @ 01:24)  Source: .Urine Clean Catch (Midstream)  Final Report (07-03-21 @ 22:26):    <10,000 CFU/mL Normal Urogenital Toya    Culture - Blood (collected 07-02-21 @ 08:06)  Source: .Blood Blood-Peripheral  Final Report (07-07-21 @ 09:00):    No growth at 5 days.    Culture - Blood (collected 07-02-21 @ 08:05)  Source: .Blood Blood-Peripheral  Final Report (07-07-21 @ 09:00):    No growth at 5 days.       SARS-CoV-2: NotDetec (07-02-21 @ 08:16)  Rapid RVP Result: NotDetec (07-02-21 @ 08:16)      < from: CT Angio Chest PE Protocol w/ IV Cont (07.06.21 @ 12:45) >     EXAM:  CT ANGIO CHEST PULM [facility]                          PROCEDURE DATE:  07/06/2021          INTERPRETATION:  CLINICAL INFORMATION: Hypoxia    COMPARISON: 7/3/2021    CONTRAST/COMPLICATIONS:  IV Contrast: Omnipaque 350  96 cc administered   4 cc discarded  Oral Contrast: NONE  Complications: None reported at time of study completion    PROCEDURE:  CT Angiography of the Chest.  Sagittal and coronal reformats were performed as well as 3D (MIP) reconstructions.    FINDINGS:    LUNGS AND AIRWAYS: Patent central airways.  Near complete compressive atelectasis of the right lower lobe. Partial atelectasis of the posterior right middle lobe and posterior right upper lobe. Left basilar compressive atelectasis. Persistent nodular density in the left upper lobe may represent pneumonia however short-term follow-up to resolution is recommended to exclude underlying malignancy.  PLEURA: Moderate right pleural effusion. Trace left pleural effusion.  MEDIASTINUM AND NUZHAT: No lymphadenopathy.  VESSELS: No definite evidence of acute pulmonary embolism. No aortic aneurysm.  HEART: Heart is mildly prominent. No pericardial effusion.  CHEST WALL AND LOWER NECK: Within normal limits.  VISUALIZED UPPER ABDOMEN: Mild hepatomegaly. Mild elevation of the right hemidiaphragm.  Contracted gallbladder.  BONES: Degenerative changes.    IMPRESSION:  No definite evidence of acute pulmonary embolism  Moderate right pleural effusion with near complete compressive atelectasis of the right lower lobe. Partial atelectasis of the posterior right middle lobe and posterior right upper lobe. Trace left pleural effusion with left basilar compressive atelectasis  Persistent nodular density in the left upper lobe may represent pneumonia however short-term follow-up to resolution is recommended to exclude underlying malignancy.    --- End of Report ---      MIGUEL SHELTON MD; Attending Radiologist  This document has been electronically signed. Jul 6 2021  1:09PM    < end of copied text >

## 2021-07-08 NOTE — PROGRESS NOTE ADULT - ASSESSMENT
66F PMH, no MD f/u presents to ER for 2-3 days of fevers, chills and right sided chest pain, worse on inspiration. minimal cough.  took tylenol at home w/o improvement.   In ER, febrile, tachy with tachypnea, leukocytosis and RLL infiltrate on CXR. Treated for suspected pneumonia. CT scan with RT sided pleural effusion and compressive atelectasis, suspicious for parapneumonic effusion, s/p R pigtail 7/6;

## 2021-07-08 NOTE — PROGRESS NOTE ADULT - SUBJECTIVE AND OBJECTIVE BOX
CC: Follow up    INTERVAL HPI/OVERNIGHT EVENTS: Patient  seen and examined, feels better this morning. Spo2 on room air at rest of 96%.       Vital Signs Last 24 Hrs  T(C): 36.7 (08 Jul 2021 04:47), Max: 36.7 (07 Jul 2021 16:57)  T(F): 98 (08 Jul 2021 04:47), Max: 98.1 (07 Jul 2021 16:57)  HR: 82 (08 Jul 2021 04:47) (81 - 101)  BP: 155/94 (08 Jul 2021 04:47) (141/85 - 155/94)  BP(mean): --  RR: 18 (08 Jul 2021 04:47) (18 - 18)  SpO2: 94% (08 Jul 2021 04:47) (94% - 97%)    PHYSICAL EXAM:    GENERAL: NAD, AOX3  HEAD:  Atraumatic, Normocephalic  EYES: conjunctiva and sclera clear  ENMT: Moist mucous membranes  NECK: Supple, No JVD  CHEST/LUNG:   Right chest tube; decreased breath sounds right lung base  HEART: Regular rate and rhythm; No murmurs, rubs, or gallops  ABDOMEN: Soft, Nontender, Nondistended; Bowel sounds present  EXTREMITIES:  2+ Peripheral Pulses, No clubbing, cyanosis, or edema        MEDICATIONS  (STANDING):  amLODIPine   Tablet 5 milliGRAM(s) Oral daily  benzonatate 100 milliGRAM(s) Oral every 8 hours  cefTRIAXone   IVPB 2000 milliGRAM(s) IV Intermittent every 24 hours  enoxaparin Injectable 40 milliGRAM(s) SubCutaneous daily  saccharomyces boulardii 250 milliGRAM(s) Oral two times a day    MEDICATIONS  (PRN):  acetaminophen   Tablet .. 650 milliGRAM(s) Oral every 6 hours PRN Temp greater or equal to 38C (100.4F), Mild Pain (1 - 3), Moderate Pain (4 - 6)  guaiFENesin Oral Liquid (Sugar-Free) 200 milliGRAM(s) Oral every 6 hours PRN Cough  ondansetron Injectable 4 milliGRAM(s) IV Push four times a day PRN Nausea and/or Vomiting  traMADol 50 milliGRAM(s) Oral three times a day PRN Severe Pain (7 - 10)      Allergies    No Known Allergies    Intolerances          LABS:                          12.5   10.88 )-----------( 670      ( 08 Jul 2021 08:47 )             38.6     07-08    134<L>  |  97<L>  |  9.0  ----------------------------<  103<H>  4.0   |  25.0  |  0.53    Ca    9.4      08 Jul 2021 08:47  Phos  3.2     07-08            RADIOLOGY & ADDITIONAL TESTS:

## 2021-07-08 NOTE — PROGRESS NOTE ADULT - PROBLEM SELECTOR PLAN 1
Maintain right pigtail to water seal, s/p right apical pneumo  Repeat chest xray tomorrow morning  Continue Ceftriaxone  Trend fevers & WCT  Continue care as per primary team  Thoracic surgery to follow  Discussed plan with Dr. Garrett & thoracic surgery in am rounds.
minimal drainage  CT flushed today, appears patent, d/c R pigtail, f/u CXR stable  if repeat CXR in AM stable, no contraindication to d/c from thoracic surgery stand point, can follow up as outpt for cytology results  on rocephin for pneumonia  thoracic surgery to follow  d/w Dr. Skelton in AM rounds

## 2021-07-08 NOTE — PROGRESS NOTE ADULT - ASSESSMENT
This 67 yo F w/ no PMHx, no MD f/u presents to ER for 2-3 days of fevers, chills and right sided chest pain, worse on inspiration. minimal cough.  took tylenol at home w/o improvement. denies GI complaints.  in ER, febrile, tachy with tachypnea, leukocytosis and RLL infiltrate on CXR. (02 Jul 2021 12:21)    She was admitted on 7/2/21 for sepsis secondary to right lower lobe community acquired pneumonia with acute hypoxic respiratory failure on 2 liters.  She had a CTA chest which ruled out PE.  A large right sided effusion was noted and - CT surgery called.  IN total, she had received  IV Rocephin and azithromycin day x 5.  She still has a non-productive cough.   ID consult for recommendations.    patient denies travel.   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    Immediately prior to ID consultation, CT surgery team had placed a right sided chest tube.  light yellow fluid from drainage.     Impression:  Right lobar pnuemonia  Right effussion  right lung atelectasis  cough  WBC elevation      Plan:   cough likely from atelectasis due to (likely parapneumonic) pleural effusion  - s/p right chest tube on 7/6/21  fluid gram stain negative so far  - follow up all outstanding cultures    WBC elevation is reactive; now normalized  - will follow and trend    CONTINUE  Ceftriaxone 2 grams Q 24H x 5 more days.   THRU and including 7/11/21  Mgmt of chest tube per CTS team      If patient is to be discharged earlier than 7/11/2021, Advise Augmentin 875mg PO BID x 14 days.

## 2021-07-09 ENCOUNTER — TRANSCRIPTION ENCOUNTER (OUTPATIENT)
Age: 66
End: 2021-07-09

## 2021-07-09 VITALS
OXYGEN SATURATION: 98 % | SYSTOLIC BLOOD PRESSURE: 140 MMHG | DIASTOLIC BLOOD PRESSURE: 74 MMHG | HEART RATE: 82 BPM | RESPIRATION RATE: 17 BRPM | TEMPERATURE: 98 F

## 2021-07-09 LAB
ALBUMIN SERPL ELPH-MCNC: 3 G/DL — LOW (ref 3.3–5.2)
ALP SERPL-CCNC: 257 U/L — HIGH (ref 40–120)
ALT FLD-CCNC: 81 U/L — HIGH
ANION GAP SERPL CALC-SCNC: 11 MMOL/L — SIGNIFICANT CHANGE UP (ref 5–17)
AST SERPL-CCNC: 47 U/L — HIGH
BILIRUB SERPL-MCNC: 0.3 MG/DL — LOW (ref 0.4–2)
BUN SERPL-MCNC: 9.5 MG/DL — SIGNIFICANT CHANGE UP (ref 8–20)
CALCIUM SERPL-MCNC: 9.3 MG/DL — SIGNIFICANT CHANGE UP (ref 8.6–10.2)
CHLORIDE SERPL-SCNC: 99 MMOL/L — SIGNIFICANT CHANGE UP (ref 98–107)
CO2 SERPL-SCNC: 24 MMOL/L — SIGNIFICANT CHANGE UP (ref 22–29)
CREAT SERPL-MCNC: 0.54 MG/DL — SIGNIFICANT CHANGE UP (ref 0.5–1.3)
GLUCOSE SERPL-MCNC: 99 MG/DL — SIGNIFICANT CHANGE UP (ref 70–99)
HCT VFR BLD CALC: 35.4 % — SIGNIFICANT CHANGE UP (ref 34.5–45)
HGB BLD-MCNC: 11.5 G/DL — SIGNIFICANT CHANGE UP (ref 11.5–15.5)
MCHC RBC-ENTMCNC: 30.7 PG — SIGNIFICANT CHANGE UP (ref 27–34)
MCHC RBC-ENTMCNC: 32.5 GM/DL — SIGNIFICANT CHANGE UP (ref 32–36)
MCV RBC AUTO: 94.4 FL — SIGNIFICANT CHANGE UP (ref 80–100)
PLATELET # BLD AUTO: 654 K/UL — HIGH (ref 150–400)
POTASSIUM SERPL-MCNC: 4.3 MMOL/L — SIGNIFICANT CHANGE UP (ref 3.5–5.3)
POTASSIUM SERPL-SCNC: 4.3 MMOL/L — SIGNIFICANT CHANGE UP (ref 3.5–5.3)
PROT SERPL-MCNC: 7.1 G/DL — SIGNIFICANT CHANGE UP (ref 6.6–8.7)
RBC # BLD: 3.75 M/UL — LOW (ref 3.8–5.2)
RBC # FLD: 13.2 % — SIGNIFICANT CHANGE UP (ref 10.3–14.5)
SODIUM SERPL-SCNC: 134 MMOL/L — LOW (ref 135–145)
WBC # BLD: 10.59 K/UL — HIGH (ref 3.8–10.5)
WBC # FLD AUTO: 10.59 K/UL — HIGH (ref 3.8–10.5)

## 2021-07-09 PROCEDURE — 88305 TISSUE EXAM BY PATHOLOGIST: CPT

## 2021-07-09 PROCEDURE — 83605 ASSAY OF LACTIC ACID: CPT

## 2021-07-09 PROCEDURE — 71045 X-RAY EXAM CHEST 1 VIEW: CPT

## 2021-07-09 PROCEDURE — 0225U NFCT DS DNA&RNA 21 SARSCOV2: CPT

## 2021-07-09 PROCEDURE — 85025 COMPLETE CBC W/AUTO DIFF WBC: CPT

## 2021-07-09 PROCEDURE — 86769 SARS-COV-2 COVID-19 ANTIBODY: CPT

## 2021-07-09 PROCEDURE — 82042 OTHER SOURCE ALBUMIN QUAN EA: CPT

## 2021-07-09 PROCEDURE — 83036 HEMOGLOBIN GLYCOSYLATED A1C: CPT

## 2021-07-09 PROCEDURE — 83615 LACTATE (LD) (LDH) ENZYME: CPT

## 2021-07-09 PROCEDURE — 83986 ASSAY PH BODY FLUID NOS: CPT

## 2021-07-09 PROCEDURE — 99232 SBSQ HOSP IP/OBS MODERATE 35: CPT

## 2021-07-09 PROCEDURE — 93005 ELECTROCARDIOGRAM TRACING: CPT

## 2021-07-09 PROCEDURE — 89051 BODY FLUID CELL COUNT: CPT

## 2021-07-09 PROCEDURE — 87075 CULTR BACTERIA EXCEPT BLOOD: CPT

## 2021-07-09 PROCEDURE — 93306 TTE W/DOPPLER COMPLETE: CPT

## 2021-07-09 PROCEDURE — 99291 CRITICAL CARE FIRST HOUR: CPT | Mod: 25

## 2021-07-09 PROCEDURE — 82945 GLUCOSE OTHER FLUID: CPT

## 2021-07-09 PROCEDURE — 71275 CT ANGIOGRAPHY CHEST: CPT

## 2021-07-09 PROCEDURE — 36415 COLL VENOUS BLD VENIPUNCTURE: CPT

## 2021-07-09 PROCEDURE — 81001 URINALYSIS AUTO W/SCOPE: CPT

## 2021-07-09 PROCEDURE — 88112 CYTOPATH CELL ENHANCE TECH: CPT

## 2021-07-09 PROCEDURE — 85027 COMPLETE CBC AUTOMATED: CPT

## 2021-07-09 PROCEDURE — 84484 ASSAY OF TROPONIN QUANT: CPT

## 2021-07-09 PROCEDURE — 87040 BLOOD CULTURE FOR BACTERIA: CPT

## 2021-07-09 PROCEDURE — 84100 ASSAY OF PHOSPHORUS: CPT

## 2021-07-09 PROCEDURE — 87205 SMEAR GRAM STAIN: CPT

## 2021-07-09 PROCEDURE — 87070 CULTURE OTHR SPECIMN AEROBIC: CPT

## 2021-07-09 PROCEDURE — 71045 X-RAY EXAM CHEST 1 VIEW: CPT | Mod: 26

## 2021-07-09 PROCEDURE — 83735 ASSAY OF MAGNESIUM: CPT

## 2021-07-09 PROCEDURE — 85730 THROMBOPLASTIN TIME PARTIAL: CPT

## 2021-07-09 PROCEDURE — 80053 COMPREHEN METABOLIC PANEL: CPT

## 2021-07-09 PROCEDURE — C1729: CPT

## 2021-07-09 PROCEDURE — 99239 HOSP IP/OBS DSCHRG MGMT >30: CPT

## 2021-07-09 PROCEDURE — 86803 HEPATITIS C AB TEST: CPT

## 2021-07-09 PROCEDURE — 85610 PROTHROMBIN TIME: CPT

## 2021-07-09 PROCEDURE — 84157 ASSAY OF PROTEIN OTHER: CPT

## 2021-07-09 PROCEDURE — 76700 US EXAM ABDOM COMPLETE: CPT

## 2021-07-09 PROCEDURE — 80061 LIPID PANEL: CPT

## 2021-07-09 PROCEDURE — 71260 CT THORAX DX C+: CPT

## 2021-07-09 PROCEDURE — 80048 BASIC METABOLIC PNL TOTAL CA: CPT

## 2021-07-09 PROCEDURE — 87102 FUNGUS ISOLATION CULTURE: CPT

## 2021-07-09 PROCEDURE — 87086 URINE CULTURE/COLONY COUNT: CPT

## 2021-07-09 RX ORDER — SACCHAROMYCES BOULARDII 250 MG
1 POWDER IN PACKET (EA) ORAL
Qty: 20 | Refills: 0
Start: 2021-07-09 | End: 2021-07-18

## 2021-07-09 RX ADMIN — Medication 250 MILLIGRAM(S): at 06:53

## 2021-07-09 RX ADMIN — AMLODIPINE BESYLATE 5 MILLIGRAM(S): 2.5 TABLET ORAL at 06:53

## 2021-07-09 RX ADMIN — Medication 100 MILLIGRAM(S): at 06:53

## 2021-07-09 RX ADMIN — Medication 100 MILLIGRAM(S): at 00:09

## 2021-07-09 RX ADMIN — CEFTRIAXONE 100 MILLIGRAM(S): 500 INJECTION, POWDER, FOR SOLUTION INTRAMUSCULAR; INTRAVENOUS at 00:10

## 2021-07-09 NOTE — DISCHARGE NOTE NURSING/CASE MANAGEMENT/SOCIAL WORK - PATIENT PORTAL LINK FT
You can access the FollowMyHealth Patient Portal offered by [facility] by registering at the following website: [email or web address]. By joining [facility] [employer]’s FollowMyHealth portal, you will also be able to view your health information using other applications (apps) compatible with our system.

## 2021-07-09 NOTE — PROGRESS NOTE ADULT - ASSESSMENT
The patient is a 66 year old female with no significant past medical history who presented to the Er with complaints of fever, chills and right sided chest pain. Admitted for sepsis secondary to right lower lobe pneumonia.     Assessment/Plan:    1. Sepsis secondary to right lower lobe community acquired pneumonia with acute hypoxic respiratory failure on 2 liters:   Sepsis resolved  Hypoxia resolved  Chest tube removed on 7/8 follow up xray with improvement  Complicated by moderate right pleural effusion likely parapneumonic causing compressive atelectasis  Status post chest tube placement by CT surgery on 7/6  Pleural fluid cultures negative thus far   Per ID  PO  Augmentin BID for 14 days  Incentive spirometry   Encourage ambulation out of bed    2, Hyponatremia with metabolic acidosis  Improved with IV hydration   Monitor bMP    3. Transaminitis Elevated LFTS  with right UQ pain: Abdominal ultrasound was normal  Trend LFTS    4. Hypophosphatemia Supplement Phos   Follow up Phos in am     5. Hypertension: Has not seen a physician in >30 years  Echocardiogram with normal LVSF grade 1 diastolic dysfunction  BP improved wtih  Norvasc 5 mg PO OD  Monitor BP        VTE Lovenox subcut     [place] for discharge home

## 2021-07-09 NOTE — PROGRESS NOTE ADULT - PROVIDER SPECIALTY LIST ADULT
Thoracic Surgery
Hospitalist
Infectious Disease
Hospitalist
Thoracic Surgery
Hospitalist

## 2021-07-09 NOTE — PROGRESS NOTE ADULT - ASSESSMENT
This 67 yo F w/ no PMHx, no MD f/u presents to ER for 2-3 days of fevers, chills and right sided chest pain, worse on inspiration. minimal cough.  took tylenol at home w/o improvement. denies GI complaints.  in ER, febrile, tachy with tachypnea, leukocytosis and RLL infiltrate on CXR. (02 Jul 2021 12:21)    She was admitted on 7/2/21 for sepsis secondary to right lower lobe community acquired pneumonia with acute hypoxic respiratory failure on 2 liters.  She had a CTA chest which ruled out PE.  A large right sided effusion was noted and - CT surgery called.  IN total, she had received  IV Rocephin and azithromycin day x 5.  She still has a non-productive cough.   ID consult for recommendations.    patient denies travel.   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    Immediately prior to ID consultation, CT surgery team had placed a right sided chest tube.  light yellow fluid from drainage.     Impression:  Right lobar pnuemonia  Right effussion  right lung atelectasis  cough  WBC elevation      Plan:   cough likely from atelectasis due to (likely parapneumonic) pleural effusion  - s/p right chest tube on 7/6/21  fluid gram stain negative so far; Cultures remain negative      WBC elevation is reactive; now normalized  - will follow and trend  chest tube removed 7/8/21    Will change antibiotics to Augmentin 875mg PO BID x 14 days.    THRU and including 7/22/21    can follow with me in 2 weeks.       No further specific infectious disease recommendations. Will be available as needed.    Thank you for allowing me to participate in the care of your patient.   Feel free to call me back for any new concerns.

## 2021-07-09 NOTE — CHART NOTE - NSCHARTNOTEFT_GEN_A_CORE
CXR Stable in AM after chest tube removal. Thoracic surgery to sign off at this time. Please re-consult as necessary.

## 2021-07-09 NOTE — PROGRESS NOTE ADULT - SUBJECTIVE AND OBJECTIVE BOX
[facility]  INFECTIOUS DISEASES AND INTERNAL MEDICINE at [place]  =======================================================  Christopher Fernandez MD  Diplomates American Board of Internal Medicine and Infectious Diseases  Tel  [phone]  Fax [phone]  =======================================================    [facility]-[id number]  SELINA RODRIGUEZ  follow up:  right pleural effusion    chest tube removed yesterday    breathing better    cultures remain negative      I have personally reviewed the labs and data; pertinent labs and data are listed in this note; please see below.   =======================================================  Past Medical & Surgical Hx:  =====================  PAST MEDICAL & SURGICAL HISTORY:  No pertinent past medical history  No significant past surgical history      Problem List:  ==========  HEALTH ISSUES - PROBLEM Dx:    Social Hx:  =======  no toxic habits currently    FAMILY HISTORY:  FH: hypertension - mother  no significant family history of immunosuppressive disorders in mother or father   =======================================================    REVIEW OF SYSTEMS:  CONSTITUTIONAL:  No Fever or chills  HEENT:  No diplopia or blurred vision.  No earache, sore throat or runny nose.  CARDIOVASCULAR:  No pressure, squeezing, strangling, tightness, heaviness or aching about the chest, neck, axilla or epigastrium.  RESPIRATORY:  POSITIVE cough, shortness of breath  GASTROINTESTINAL:  No nausea, vomiting or diarrhea.  GENITOURINARY:  No dysuria, frequency or urgency. No Blood in urine  MUSCULOSKELETAL:  no joint aches, no muscle pain  SKIN:  No change in skin, hair or nails.  NEUROLOGIC:  No Headaches, seizures or weakness.  PSYCHIATRIC:  No disorder of thought or mood.  ENDOCRINE:  No heat or cold intolerance  HEMATOLOGICAL:  No easy bruising or bleeding.    =======================================================  Allergies  No Known Allergies    ======================================================  Physical Exam:  ============     General:  No acute distress.  THIN  Eye: Pupils are equal, round and reactive to light, Extraocular movements are intact, Normal conjunctiva.  HENT: Normocephalic, Oral mucosa is moist, No pharyngeal erythema, No sinus tenderness.  Neck: Supple, No lymphadenopathy.  Respiratory: Lungs with crackles at RIGHT mid zone.  improved aeration at right base  LEFT lung zone with good air entry  Cardiovascular: Normal rate, Regular rhythm,   Gastrointestinal: Soft, Non-tender, Non-distended, Normal bowel sounds.  Genitourinary: No costovertebral angle tenderness.  Lymphatics: No lymphadenopathy neck,   Musculoskeletal: Normal range of motion, Normal strength.  Integumentary: No rash.  Neurologic: Alert, Oriented, No focal deficits, Cranial Nerves II-XII are grossly intact.  Psychiatric: Appropriate mood & affect.    =======================================================  Vitals:  ============  T(F): 98.2 (09 Jul 2021 10:18), Max: 98.9 (08 Jul 2021 17:42)  HR: 90 (09 Jul 2021 10:18)  BP: 131/80 (09 Jul 2021 10:18)  RR: 18 (09 Jul 2021 10:18)  SpO2: 95% (09 Jul 2021 10:18) (93% - 95%)  temp max in last 48H T(F): , Max: 98.9 (07-08-21 @ 17:42)    =======================================================  Current Antibiotics:  cefTRIAXone   IVPB 2000 milliGRAM(s) IV Intermittent every 24 hours    Other medications:  amLODIPine   Tablet 5 milliGRAM(s) Oral daily  benzonatate 100 milliGRAM(s) Oral every 8 hours  enoxaparin Injectable 40 milliGRAM(s) SubCutaneous daily  saccharomyces boulardii 250 milliGRAM(s) Oral two times a day      =======================================================  Labs:                        11.5   10.59 )-----------( 654      ( 09 Jul 2021 06:55 )             35.4     07-09    134<L>  |  99  |  9.5  ----------------------------<  99  4.3   |  24.0  |  0.54    Ca    9.3      09 Jul 2021 06:55  Phos  3.2     07-08    TPro  7.1  /  Alb  3.0<L>  /  TBili  0.3<L>  /  DBili  x   /  AST  47<H>  /  ALT  81<H>  /  AlkPhos  257<H>  07-09      Culture - Fungal, Body Fluid (collected 07-06-21 @ 22:03)  Source: .Body Fluid Pleural Fluid    Culture - Body Fluid with Gram Stain (collected 07-06-21 @ 22:03)  Source: .Body Fluid Pleural Fluid  Gram Stain (07-07-21 @ 01:28):    polymorphonuclear leukocytes seen    No organisms seen    by cytocentrifuge    Culture - Urine (collected 07-03-21 @ 01:24)  Source: .Urine Clean Catch (Midstream)  Final Report (07-03-21 @ 22:26):    <10,000 CFU/mL Normal Urogenital Toya    Culture - Blood (collected 07-02-21 @ 08:06)  Source: .Blood Blood-Peripheral  Final Report (07-07-21 @ 09:00):    No growth at 5 days.    Culture - Blood (collected 07-02-21 @ 08:05)  Source: .Blood Blood-Peripheral  Final Report (07-07-21 @ 09:00):    No growth at 5 days.       SARS-CoV-2: NotDetec (07-02-21 @ 08:16)  Rapid RVP Result: NotDetec (07-02-21 @ 08:16)        < from: CT Angio Chest PE Protocol w/ IV Cont (07.06.21 @ 12:45) >     EXAM:  CT ANGIO CHEST PULM ART [facility]                          PROCEDURE DATE:  07/06/2021          INTERPRETATION:  CLINICAL INFORMATION: Hypoxia    COMPARISON: 7/3/2021    CONTRAST/COMPLICATIONS:  IV Contrast: Omnipaque 350  96 cc administered   4 cc discarded  Oral Contrast: NONE  Complications: None reported at time of study completion    PROCEDURE:  CT Angiography of the Chest.  Sagittal and coronal reformats were performed as well as 3D (MIP) reconstructions.    FINDINGS:    LUNGS AND AIRWAYS: Patent central airways.  Near complete compressive atelectasis of the right lower lobe. Partial atelectasis of the posterior right middle lobe and posterior right upper lobe. Left basilar compressive atelectasis. Persistent nodular density in the left upper lobe may represent pneumonia however short-term follow-up to resolution is recommended to exclude underlying malignancy.  PLEURA: Moderate right pleural effusion. Trace left pleural effusion.  MEDIASTINUM AND NUZHAT: No lymphadenopathy.  VESSELS: No definite evidence of acute pulmonary embolism. No aortic aneurysm.  HEART: Heart is mildly prominent. No pericardial effusion.  CHEST WALL AND LOWER NECK: Within normal limits.  VISUALIZED UPPER ABDOMEN: Mild hepatomegaly. Mild elevation of the right hemidiaphragm.  Contracted gallbladder.  BONES: Degenerative changes.    IMPRESSION:  No definite evidence of acute pulmonary embolism  Moderate right pleural effusion with near complete compressive atelectasis of the right lower lobe. Partial atelectasis of the posterior right middle lobe and posterior right upper lobe. Trace left pleural effusion with left basilar compressive atelectasis  Persistent nodular density in the left upper lobe may represent pneumonia however short-term follow-up to resolution is recommended to exclude underlying malignancy.    --- End of Report ---      MIGUEL SHELTON MD; Attending Radiologist  This document has been electronically signed. Jul 6 2021  1:09PM    < end of copied text >   Detail Level: Detailed Patient Specific Counseling (Will Not Stick From Patient To Patient): -Discussed that occasionally AKs heal differently and sometimes they don’t blister and scab\\n-I recommend we let it heal for another month or two months and if it is still present we will biopsy\\n-I also recommend applying Vaseline and a bandaid every couple of days to help it heal Patient Specific Counseling (Will Not Stick From Patient To Patient): -Further discussed Mohs that patient is to have with Dr. Blackburn on 11/27/19

## 2021-07-09 NOTE — PROGRESS NOTE ADULT - REASON FOR ADMISSION
back/chest pain

## 2021-07-09 NOTE — CHART NOTE - NSCHARTNOTEFT_GEN_A_CORE
To whom it may concern,    Re: SELINA RODRIGUEZ    Patient was admitted under the care of [facility] in [place], [place] from 07/02/2021 to today, 07/09/2021 . Patient is cleared at this time to return to work without restriction on 08/09/2021 unless clinical status changes.    If there are any questions, please contact me at [phone]. Thank you.    SignedAyesha PA-C

## 2021-07-09 NOTE — PROGRESS NOTE ADULT - SUBJECTIVE AND OBJECTIVE BOX
CC: Follow up    INTERVAL HPI/OVERNIGHT EVENTS: Patient seen and examined, feels better today. Spo2 >94% on room air. chest tube removed yesterday. Afebrile. Denies sob or cough      Vital Signs Last 24 Hrs  T(C): 36.8 (09 Jul 2021 10:18), Max: 37.2 (08 Jul 2021 17:42)  T(F): 98.2 (09 Jul 2021 10:18), Max: 98.9 (08 Jul 2021 17:42)  HR: 90 (09 Jul 2021 10:18) (90 - 104)  BP: 131/80 (09 Jul 2021 10:18) (127/82 - 139/84)  BP(mean): --  RR: 18 (09 Jul 2021 10:18) (18 - 20)  SpO2: 95% (09 Jul 2021 10:18) (93% - 95%)    PHYSICAL EXAM:    GENERAL: NAD, AOX3  HEAD:  Atraumatic, Normocephalic  EYES: conjunctiva and sclera clear  ENMT: Moist mucous membranes  NECK: Supple, No JVD  CHEST/LUNG: Clear to auscultation bilaterally; No rales, rhonchi, wheezing, or rubs  HEART: Regular rate and rhythm; No murmurs, rubs, or gallops  ABDOMEN: Soft, Nontender, Nondistended; Bowel sounds present  EXTREMITIES:  2+ Peripheral Pulses, No clubbing, cyanosis, or edema        MEDICATIONS  (STANDING):  amLODIPine   Tablet 5 milliGRAM(s) Oral daily  amoxicillin  875 milliGRAM(s)/clavulanate 1 Tablet(s) Oral two times a day  benzonatate 100 milliGRAM(s) Oral every 8 hours  enoxaparin Injectable 40 milliGRAM(s) SubCutaneous daily  saccharomyces boulardii 250 milliGRAM(s) Oral two times a day    MEDICATIONS  (PRN):  acetaminophen   Tablet .. 650 milliGRAM(s) Oral every 6 hours PRN Temp greater or equal to 38C (100.4F), Mild Pain (1 - 3), Moderate Pain (4 - 6)  guaiFENesin Oral Liquid (Sugar-Free) 200 milliGRAM(s) Oral every 6 hours PRN Cough  ondansetron Injectable 4 milliGRAM(s) IV Push four times a day PRN Nausea and/or Vomiting  traMADol 50 milliGRAM(s) Oral three times a day PRN Severe Pain (7 - 10)      Allergies    No Known Allergies    Intolerances          LABS:                          11.5   10.59 )-----------( 654      ( 09 Jul 2021 06:55 )             35.4     07-09    134<L>  |  99  |  9.5  ----------------------------<  99  4.3   |  24.0  |  0.54    Ca    9.3      09 Jul 2021 06:55  Phos  3.2     07-08    TPro  7.1  /  Alb  3.0<L>  /  TBili  0.3<L>  /  DBili  x   /  AST  47<H>  /  ALT  81<H>  /  AlkPhos  257<H>  07-09          RADIOLOGY & ADDITIONAL TESTS:

## 2021-07-11 LAB
CULTURE RESULTS: SIGNIFICANT CHANGE UP
SPECIMEN SOURCE: SIGNIFICANT CHANGE UP

## 2021-07-12 LAB — NON-GYNECOLOGICAL CYTOLOGY STUDY: SIGNIFICANT CHANGE UP

## 2021-07-14 PROBLEM — Z78.9 OTHER SPECIFIED HEALTH STATUS: Chronic | Status: ACTIVE | Noted: 2021-07-02

## 2021-07-28 PROBLEM — Z00.00 ENCOUNTER FOR PREVENTIVE HEALTH EXAMINATION: Status: ACTIVE | Noted: 2021-07-28

## 2021-07-29 ENCOUNTER — APPOINTMENT (OUTPATIENT)
Dept: INTERNAL MEDICINE | Facility: CLINIC | Age: 66
End: 2021-07-29
Payer: COMMERCIAL

## 2021-07-29 VITALS
SYSTOLIC BLOOD PRESSURE: 141 MMHG | HEART RATE: 83 BPM | WEIGHT: 146.31 LBS | OXYGEN SATURATION: 98 % | TEMPERATURE: 97.6 F | DIASTOLIC BLOOD PRESSURE: 83 MMHG | RESPIRATION RATE: 15 BRPM

## 2021-07-29 DIAGNOSIS — D72.829 ELEVATED WHITE BLOOD CELL COUNT, UNSPECIFIED: ICD-10-CM

## 2021-07-29 PROCEDURE — 99213 OFFICE O/P EST LOW 20 MIN: CPT

## 2021-07-29 NOTE — PHYSICAL EXAM
[General Appearance - Alert] : alert [General Appearance - In No Acute Distress] : in no acute distress [Sclera] : the sclera and conjunctiva were normal [PERRL With Normal Accommodation] : pupils were equal in size, round, reactive to light [Extraocular Movements] : extraocular movements were intact [Outer Ear] : the ears and nose were normal in appearance [Oropharynx] : the oropharynx was normal with no thrush [Neck Appearance] : the appearance of the neck was normal [Neck Cervical Mass (___cm)] : no neck mass was observed [Jugular Venous Distention Increased] : there was no jugular-venous distention [Thyroid Diffuse Enlargement] : the thyroid was not enlarged [Exaggerated Use Of Accessory Muscles For Inspiration] : no accessory muscle use [FreeTextEntry1] : SLIGHT decreased air entry right lower lung zone.  NO dullness to percussion.  [Heart Rate And Rhythm] : heart rate was normal and rhythm regular [Heart Sounds] : normal S1 and S2 [Heart Sounds Gallop] : no gallops [Murmurs] : no murmurs [Heart Sounds Pericardial Friction Rub] : no pericardial rub [Full Pulse] : the pedal pulses are present [Edema] : there was no peripheral edema [Bowel Sounds] : normal bowel sounds [Abdomen Soft] : soft [Abdomen Tenderness] : non-tender [Abdomen Mass (___ Cm)] : no abdominal mass palpated [Costovertebral Angle Tenderness] : no CVA tenderness [No Palpable Adenopathy] : no palpable adenopathy [Musculoskeletal - Swelling] : no joint swelling [Nail Clubbing] : no clubbing  or cyanosis of the fingernails [Motor Tone] : muscle strength and tone were normal [Skin Color & Pigmentation] : normal skin color and pigmentation [] : no rash [Cranial Nerves] : cranial nerves 2-12 were intact [Sensation] : the sensory exam was normal to light touch and pinprick [No Focal Deficits] : no focal deficits [Oriented To Time, Place, And Person] : oriented to person, place, and time [Affect] : the affect was normal

## 2021-07-29 NOTE — ASSESSMENT
[FreeTextEntry1] : patient is clinically better\par - defer additional antibiotics\par \par she has appt with Dr. Camilo Garrett next week 8/5/2021\par - can have follow up CXR at that time\par \par her WBC elevation had resolved in the hospital. \par \par \par she can followup with her primary team for routine blood work in 1 -2 months. \par \par \par

## 2021-07-29 NOTE — HISTORY OF PRESENT ILLNESS
[FreeTextEntry1] : This 67 yo F w/ no PMHx, no MD f/u presents to ER for 2-3 days of fevers, chills and right sided chest pain, worse on inspiration. She was admitted on 7/2/2021 for  leukocytosis and RLL infiltrate on CXR, Pneumonia with sepsis. \par \par course was significant for a large right sided effusion; CT surgery called, and placed a right sided chest tube, draining out yellow fluid. \par She had received IV Rocephin and azithromycin day x 5. She still has a non-productive cough.\par ID consult for recommendations 7/6/2021. \par \par SHE WAS last seen 7/9/2021.\par \par her cough was likely from atelectasis due to (likely parapneumonic) pleural effusion; s/p right chest tube on 7/6/21\par fluid gram stain negative so far; Cultures remain negative\par \par WBC elevation was reactive; which normalized\par chest tube removed 7/8/21\par \par Her antibiotics were changed to Augmentin 875 mg PO BID x 14 days.\par  THRU and including 7/22/21\par \par She is here for follow up.\par feeling better\par \par still with some ache in the right chest wall, but less everyday. \par \par \par ===========\par \par \par Social Hx:\par =======\par no toxic habits currently\par \par FAMILY HISTORY:\par FH: hypertension - mother\par no significant family history of immunosuppressive disorders in mother or\par father\par  =======================================================

## 2021-07-29 NOTE — DATA REVIEWED
[FreeTextEntry1] : \par Labs:\par \par  11.5\par 10.59 )-----------( 654 ( 09 Jul 2021 06:55 )\par  35.4\par \par 07-09\par \par 134<L> | 99 | 9.5\par ----------------------------< 99\par 4.3 | 24.0 | 0.54\par \par Ca 9.3 09 Jul 2021 06:55\par Phos 3.2 07-08\par \par TPro 7.1 / Alb 3.0<L> / TBili 0.3<L> / DBili x / AST 47<H> /\par ALT 81<H> / AlkPhos 257<H> 07-09\par \par \par Culture - Fungal, Body Fluid (collected 07-06-21 @ 22:03)\par Source: .Body Fluid Pleural Fluid\par \par Culture - Body Fluid with Gram Stain (collected 07-06-21 @ 22:03)\par Source: .Body Fluid Pleural Fluid\par Gram Stain (07-07-21 @ 01:28):\par  polymorphonuclear leukocytes seen\par  No organisms seen\par  by cytocentrifuge\par \par Culture - Urine (collected 07-03-21 @ 01:24)\par Source: .Urine Clean Catch (Midstream)\par Final Report (07-03-21 @ 22:26):\par  <10,000 CFU/mL Normal Urogenital Toya\par \par Culture - Blood (collected 07-02-21 @ 08:06)\par Source: .Blood Blood-Peripheral\par Final Report (07-07-21 @ 09:00):\par  No growth at 5 days.\par \par Culture - Blood (collected 07-02-21 @ 08:05)\par Source: .Blood Blood-Peripheral\par Final Report (07-07-21 @ 09:00):\par  No growth at 5 days.\par \par \par SARS-CoV-2: NotDetec (07-02-21 @ 08:16)\par Rapid RVP Result: NotDetec (07-02-21 @ 08:16)\par \par \par \par < from: CT Angio Chest PE Protocol w/ IV Cont (07.06.21 @ 12:45) >\par \par  EXAM: CT ANGIO CHEST PULM ART [facility]\par \par PROCEDURE DATE: 07/06/2021\par \par \par \par INTERPRETATION: CLINICAL INFORMATION: Hypoxia\par \par COMPARISON: 7/3/2021\par \par CONTRAST/COMPLICATIONS:\par IV Contrast: Omnipaque 350 96 cc administered 4 cc discarded\par Oral Contrast: NONE\par Complications: None reported at time of study completion\par \par PROCEDURE:\par CT Angiography of the Chest.\par Sagittal and coronal reformats were performed as well as 3D (MIP)\par reconstructions.\par \par FINDINGS:\par \par LUNGS AND AIRWAYS: Patent central airways. Near complete compressive\par atelectasis of the right lower lobe. Partial atelectasis of the posterior right\par middle lobe and posterior right upper lobe. Left basilar compressive\par atelectasis. Persistent nodular density in the left upper lobe may represent\par pneumonia however short-term follow-up to resolution is recommended to exclude\par underlying malignancy.\par PLEURA: Moderate right pleural effusion. Trace left pleural effusion.\par MEDIASTINUM AND NUZHAT: No lymphadenopathy.\par VESSELS: No definite evidence of acute pulmonary embolism. No aortic aneurysm.\par HEART: Heart is mildly prominent. No pericardial effusion.\par CHEST WALL AND LOWER NECK: Within normal limits.\par VISUALIZED UPPER ABDOMEN: Mild hepatomegaly. Mild elevation of the right\par hemidiaphragm. Contracted gallbladder.\par BONES: Degenerative changes.\par \par IMPRESSION:\par No definite evidence of acute pulmonary embolism\par Moderate right pleural effusion with near complete compressive atelectasis of\par the right lower lobe. Partial atelectasis of the posterior right middle lobe\par and posterior right upper lobe. Trace left pleural effusion with left basilar\par compressive atelectasis\par Persistent nodular density in the left upper lobe may represent pneumonia\par however short-term follow-up to resolution is recommended to exclude underlying\par malignancy.\par \par --- End of Report ---\par \par \par MIGUEL SHELTON MD; Attending Radiologist\par This document has been electronically signed. Jul 6 2021 1:09PM\par \par < end of copied text >\par

## 2021-08-01 PROBLEM — J90 PLEURAL EFFUSION: Status: ACTIVE | Noted: 2021-07-29

## 2021-08-01 PROBLEM — J18.9 PNEUMONIA OF RIGHT LOWER LOBE DUE TO INFECTIOUS ORGANISM: Status: ACTIVE | Noted: 2021-07-29

## 2021-08-01 PROBLEM — Z86.79 HISTORY OF HYPERTENSION: Status: RESOLVED | Noted: 2021-08-01 | Resolved: 2021-08-01

## 2021-08-04 LAB
CULTURE RESULTS: SIGNIFICANT CHANGE UP
SPECIMEN SOURCE: SIGNIFICANT CHANGE UP

## 2021-08-05 ENCOUNTER — APPOINTMENT (OUTPATIENT)
Dept: THORACIC SURGERY | Facility: CLINIC | Age: 66
End: 2021-08-05
Payer: COMMERCIAL

## 2021-08-05 VITALS
TEMPERATURE: 98 F | BODY MASS INDEX: 23.9 KG/M2 | WEIGHT: 140 LBS | OXYGEN SATURATION: 97 % | RESPIRATION RATE: 18 BRPM | HEIGHT: 64 IN | HEART RATE: 70 BPM | SYSTOLIC BLOOD PRESSURE: 138 MMHG | DIASTOLIC BLOOD PRESSURE: 84 MMHG

## 2021-08-05 DIAGNOSIS — J18.9 PNEUMONIA, UNSPECIFIED ORGANISM: ICD-10-CM

## 2021-08-05 DIAGNOSIS — Z86.79 PERSONAL HISTORY OF OTHER DISEASES OF THE CIRCULATORY SYSTEM: ICD-10-CM

## 2021-08-05 DIAGNOSIS — J90 PLEURAL EFFUSION, NOT ELSEWHERE CLASSIFIED: ICD-10-CM

## 2021-08-05 DIAGNOSIS — Z78.9 OTHER SPECIFIED HEALTH STATUS: ICD-10-CM

## 2021-08-05 PROCEDURE — 99245 OFF/OP CONSLTJ NEW/EST HI 55: CPT

## 2021-08-05 RX ORDER — MULTIVIT-MIN/IRON/FOLIC ACID/K 18-600-40
CAPSULE ORAL
Refills: 0 | Status: ACTIVE | COMMUNITY

## 2021-08-05 RX ORDER — AMLODIPINE BESYLATE 5 MG/1
5 TABLET ORAL
Refills: 0 | Status: ACTIVE | COMMUNITY

## 2021-08-05 NOTE — ASSESSMENT
[FreeTextEntry1] : I had the pleasure of evaluating Ms. Hoffman. She  originally presented  to [facility] on  7.2.21 with a chief complaint of right sided chest pain with inspiration, shortness of breath,  and fever x 3 days. She was diagnosed with pneumonia and a pleural effusion. She was  treated with antibiotic and a pleural drain was inserted and  removed.  Pathology of the pleural fluid was benign. Today she reports no symptoms.\par \par Clinically,  Ms. Hoffman  progress is optimal.  I have reviewed the patient's medical records and diagnostic images during the time of this office visit, and I have made the following recommendations to  repeat a Non- Contrast CT Chest Scan in 2 months to re-evaluate her  pleural effusion,  since her last chest Xray on 7.9.21 showed a residual mild right sided pleural effusion.  Expectations reviewed with patient. All questions addressed. Patient agrees and will adhere to plan. \par \par PLAN: \par  - Repeat Non- Contrast CT Chest Scan in 2 months \par \par Alejandra HASSAN NP am scribing for and in the presence of   the following sections HISTORY OF PRESENT ILLNESS, PAST MEDICAL/FAMILY/SOCIAL HISTORY; REVIEW OF SYSTEMS; VITAL SIGNS; PHYSICAL EXAM; DISPOSITION.\par \par "I personally performed the services described in the documentation, reviewed the documentation recorded by the scribe in my presence and accurately and completely records my words and actions."\par

## 2021-08-05 NOTE — PHYSICAL EXAM
[General Appearance - Alert] : alert [Sclera] : the sclera and conjunctiva were normal [Neck Appearance] : the appearance of the neck was normal [Respiration, Rhythm And Depth] : normal respiratory rhythm and effort [Exaggerated Use Of Accessory Muscles For Inspiration] : no accessory muscle use [Auscultation Breath Sounds / Voice Sounds] : lungs were clear to auscultation bilaterally [Heart Rate And Rhythm] : heart rate was normal and rhythm regular [Examination Of The Chest] : the chest was normal in appearance [Chest Visual Inspection Thoracic Asymmetry] : no chest asymmetry [Abnormal Walk] : normal gait [] : no rash [Motor Exam] : the motor exam was normal [Oriented To Time, Place, And Person] : oriented to person, place, and time [Hearing Threshold Finger Rub Not [place]] : hearing was normal

## 2021-08-05 NOTE — DATA REVIEWED
[FreeTextEntry1] : 7.6.21 Final Diagnosis\par PLEURAL FLUID, RIGHT\par NEGATIVE FOR MALIGNANT CELLS.\par \par 7.9.21 Chest Xray  from [facility] \par Residual mild RIGHT effusion and/or basilar airspace consolidation.\par LEFT lung base linear atelectasis..\par  \par 7.6.21  CTA Chest  from [facility] \par -No definite evidence of acute pulmonary embolism\par -Moderate right pleural effusion with near complete compressive atelectasis of the right lower lobe. Partial atelectasis of the posterior right middle lobe and posterior right upper lobe. Trace left pleural effusion with left basilar compressive atelectasis\par -Persistent nodular density in the left upper lobe may represent pneumonia however short-term follow-up to resolution is recommended to exclude underlying malignancy.\par \par 7.3.21  CT CHEST IC\par * Moderate right and trace left pleural effusion with bibasilar atelectasis.\par * Small foci of consolidation in the upper lobes, possibly infectious. Follow-up to complete resolution.\par \par 7.2.21 Chest Xray from [facility] \par -Bilateral effusions right greater than left.

## 2021-08-05 NOTE — HISTORY OF PRESENT ILLNESS
[FreeTextEntry1] : Ms. RODRIGUEZ is a 66 year old female referred by Dr.Sabaa Walls  for a initial consultation for pneumonia and pleural effusion. She  originally presented  to [facility] on  7.2.21 with a chief complaint of right sided chest pain  with inspiration, shortness of breath,  and a fever for  3 days.   Her  EKG was  unremarkable, however she  was found to be  hypoxic with O2 saturation of 89% on room air.She was placed on  3 LNC and sepsis protocol was initiated.She was found to have  right lower lobe  pneumonia and she was treated with  IV Rocephin and  Azithromycin. Other findings also included a  CTA  that showed  moderate right pleural effusion causing  compressive atelectasis. A  right  sided pigtail chest tube was placed for drainage of the  pleural effusion with a output of  200cc of serous fluid drained.  Her pleural fluid pathology  showed exudative effusion and her cultures were negative. Pigtail was later removed inpatient and she was discharged home with  10 days of  Augmentin PO. Past medical history is significant for HTN, recent pneumonia and pleural effusion.  \par \par Today she offers no complaint. She is here to discuss her progress.

## 2022-01-21 NOTE — ED ADULT NURSE NOTE - CAS TRG GENERAL NORM CIRC DET
Strong peripheral pulses
PAST SURGICAL HISTORY:  History of lumbar laminectomy     S/P cholecystectomy 2012

## 2022-11-14 ENCOUNTER — NON-APPOINTMENT (OUTPATIENT)
Age: 67
End: 2022-11-14